# Patient Record
Sex: FEMALE | Race: WHITE | NOT HISPANIC OR LATINO
[De-identification: names, ages, dates, MRNs, and addresses within clinical notes are randomized per-mention and may not be internally consistent; named-entity substitution may affect disease eponyms.]

---

## 2018-03-05 ENCOUNTER — APPOINTMENT (OUTPATIENT)
Dept: OPHTHALMOLOGY | Facility: CLINIC | Age: 53
End: 2018-03-05
Payer: COMMERCIAL

## 2018-03-05 DIAGNOSIS — H04.203 UNSPECIFIED EPIPHORA, BILATERAL: ICD-10-CM

## 2018-03-05 PROCEDURE — 92002 INTRM OPH EXAM NEW PATIENT: CPT

## 2018-03-26 ENCOUNTER — APPOINTMENT (OUTPATIENT)
Dept: OPHTHALMOLOGY | Facility: CLINIC | Age: 53
End: 2018-03-26

## 2019-12-14 ENCOUNTER — TRANSCRIPTION ENCOUNTER (OUTPATIENT)
Age: 54
End: 2019-12-14

## 2020-01-01 ENCOUNTER — TRANSCRIPTION ENCOUNTER (OUTPATIENT)
Age: 55
End: 2020-01-01

## 2020-01-03 ENCOUNTER — TRANSCRIPTION ENCOUNTER (OUTPATIENT)
Age: 55
End: 2020-01-03

## 2020-10-16 PROBLEM — Z00.00 ENCOUNTER FOR PREVENTIVE HEALTH EXAMINATION: Noted: 2020-10-16

## 2020-10-28 ENCOUNTER — NON-APPOINTMENT (OUTPATIENT)
Age: 55
End: 2020-10-28

## 2020-10-28 ENCOUNTER — APPOINTMENT (OUTPATIENT)
Dept: GASTROENTEROLOGY | Facility: CLINIC | Age: 55
End: 2020-10-28
Payer: COMMERCIAL

## 2020-10-28 PROCEDURE — 99442: CPT

## 2020-11-10 ENCOUNTER — TRANSCRIPTION ENCOUNTER (OUTPATIENT)
Age: 55
End: 2020-11-10

## 2020-11-13 ENCOUNTER — OUTPATIENT (OUTPATIENT)
Dept: OUTPATIENT SERVICES | Facility: HOSPITAL | Age: 55
LOS: 1 days | End: 2020-11-13
Payer: COMMERCIAL

## 2020-11-13 ENCOUNTER — APPOINTMENT (OUTPATIENT)
Dept: GASTROENTEROLOGY | Facility: HOSPITAL | Age: 55
End: 2020-11-13
Payer: COMMERCIAL

## 2020-11-13 VITALS — SYSTOLIC BLOOD PRESSURE: 161 MMHG | DIASTOLIC BLOOD PRESSURE: 82 MMHG

## 2020-11-13 VITALS
WEIGHT: 231.93 LBS | OXYGEN SATURATION: 97 % | HEART RATE: 81 BPM | HEIGHT: 63 IN | TEMPERATURE: 97 F | DIASTOLIC BLOOD PRESSURE: 82 MMHG | SYSTOLIC BLOOD PRESSURE: 161 MMHG | RESPIRATION RATE: 13 BRPM

## 2020-11-13 DIAGNOSIS — K21.9 GASTRO-ESOPHAGEAL REFLUX DISEASE WITHOUT ESOPHAGITIS: ICD-10-CM

## 2020-11-13 DIAGNOSIS — Z98.89 OTHER SPECIFIED POSTPROCEDURAL STATES: Chronic | ICD-10-CM

## 2020-11-13 DIAGNOSIS — E04.1 NONTOXIC SINGLE THYROID NODULE: Chronic | ICD-10-CM

## 2020-11-13 PROCEDURE — C1889: CPT

## 2020-11-13 PROCEDURE — 91010 ESOPHAGUS MOTILITY STUDY: CPT | Mod: 26

## 2020-11-13 PROCEDURE — 91010 ESOPHAGUS MOTILITY STUDY: CPT

## 2020-11-13 PROCEDURE — 91037 ESOPH IMPED FUNCTION TEST: CPT | Mod: 26

## 2020-11-13 RX ORDER — SODIUM CHLORIDE 9 MG/ML
1000 INJECTION INTRAMUSCULAR; INTRAVENOUS; SUBCUTANEOUS
Refills: 0 | Status: DISCONTINUED | OUTPATIENT
Start: 2020-11-13 | End: 2020-11-13

## 2020-11-16 PROCEDURE — 91038 ESOPH IMPED FUNCT TEST > 1HR: CPT | Mod: 26

## 2021-08-03 ENCOUNTER — TRANSCRIPTION ENCOUNTER (OUTPATIENT)
Age: 56
End: 2021-08-03

## 2021-10-19 ENCOUNTER — APPOINTMENT (OUTPATIENT)
Dept: GASTROENTEROLOGY | Facility: CLINIC | Age: 56
End: 2021-10-19
Payer: COMMERCIAL

## 2021-10-19 VITALS
DIASTOLIC BLOOD PRESSURE: 77 MMHG | SYSTOLIC BLOOD PRESSURE: 132 MMHG | HEIGHT: 63 IN | HEART RATE: 96 BPM | BODY MASS INDEX: 42.17 KG/M2 | WEIGHT: 238 LBS | OXYGEN SATURATION: 97 %

## 2021-10-19 DIAGNOSIS — K22.711 BARRETT'S ESOPHAGUS WITH HIGH GRADE DYSPLASIA: ICD-10-CM

## 2021-10-19 PROCEDURE — 99214 OFFICE O/P EST MOD 30 MIN: CPT

## 2021-10-19 NOTE — HISTORY OF PRESENT ILLNESS
[FreeTextEntry1] : 55 yo F pmh Obesity s/p sleeve, arthritis being worked up for RA who presents to discuss GERD despite therapy and EGJOO on manometry.  Patient previously presented for emano and ph impedance OFF therapy as open access procedure.  Now referred by Dr. Dafne Bernard for next steps.\par \par GERD despite therapy\par Patient had positive pH study OFF therapy with good correlation to belching, regurgitation\par Since starting PPI/h2RA -> has had sig reduction in heartburn/regurgitation\par What has persistent is the early fullness and significant belching\par Has modified her diet greatly\par There is some nausea\par Heavier foods make this worse\par Feels everything sits in mid abdomen/belly in her abdomen\par No true dysphagia\par No odynophagia\par \par Has had 20 lb weight gain over past few months -> has led to some worsening symptoms as well\par \par \par Obesity\par s/p Gastric Sleeve - 2015 - done at [facility] (Dr. Yates)\par Had Vit A deficiency post as well as borderline iron leves\par Has not had rechecking of vitamins in quite some time\par Had Bas 1+ year ago - didn’t show any angulation\par Overall 70 lb weight gain, but \par \par \par Prior Workup:\par \par BaS 8/2020\par Small HH, no true dysmotility \par s/p sleeve -> no comment about anatomy\par \par E-mano 11/2020\par EGJOO in gastric sleeve\par Normal peristalsis otherwise\par \par pH study: OFF therapy\par 21.2 % AET - increase both supine, upright\par Good corerlation to belching

## 2021-10-19 NOTE — PHYSICAL EXAM
[General Appearance - Alert] : alert [General Appearance - Well Nourished] : well nourished [Sclera] : the sclera and conjunctiva were normal [Extraocular Movements] : extraocular movements were intact [Hearing Threshold Finger Rub Not [place]] : hearing was normal [Neck Appearance] : the appearance of the neck was normal [Neck Cervical Mass (___cm)] : no neck mass was observed [Respiration, Rhythm And Depth] : normal respiratory rhythm and effort [Auscultation Breath Sounds / Voice Sounds] : lungs were clear to auscultation bilaterally [Exaggerated Use Of Accessory Muscles For Inspiration] : no accessory muscle use [Edema] : there was no peripheral edema [Bowel Sounds] : normal bowel sounds [Abdomen Tenderness] : non-tender [Abdomen Soft] : soft [Abdomen Mass (___ Cm)] : no abdominal mass palpated [Abnormal Walk] : normal gait [Involuntary Movements] : no involuntary movements were seen [] : no rash [No Focal Deficits] : no focal deficits [FreeTextEntry1] : aox3 [Impaired Insight] : insight and judgment were intact [Affect] : the affect was normal

## 2021-10-19 NOTE — ASSESSMENT
[FreeTextEntry1] : 55 yo F pmh Obesity s/p sleeve, arthritis being worked up for RA who presents to discuss GERD despite therapy and EGJOO on manometry.  Appears to have significant food retention in either proximal stomach or distal esophagus given symptoms.  Significant belching as well.  Unclear if this is GERD despite therapy, due to angulation in sleeve, or EGJOO related.  At this point, would pursue workup for each.\par \par Impression:\par 1) Early Satiety\par 2) GERD despite therapy\par 3) Belching\par 4) Obesity s/p sleeve\par 5) Regurgitation\par \par Plan:\par 1) UGIS including assessment of sleeve and angulation\par 2) EGD + EndoFLIP\par 3) 24 hour pH impedance ON therapy - LES on mano study at 46.2 cm\par 4) All discussed at length.  All questions answered.  Plan agreed upon\par 5) RV 2-4 weeks after all testing

## 2021-10-20 LAB
BASOPHILS # BLD AUTO: 0.03 K/UL
BASOPHILS NFR BLD AUTO: 0.5 %
EOSINOPHIL # BLD AUTO: 0.09 K/UL
EOSINOPHIL NFR BLD AUTO: 1.5 %
HCT VFR BLD CALC: 40.4 %
HGB BLD-MCNC: 12.7 G/DL
IMM GRANULOCYTES NFR BLD AUTO: 0.2 %
LYMPHOCYTES # BLD AUTO: 2.22 K/UL
LYMPHOCYTES NFR BLD AUTO: 37.8 %
MAN DIFF?: NORMAL
MCHC RBC-ENTMCNC: 26.6 PG
MCHC RBC-ENTMCNC: 31.4 GM/DL
MCV RBC AUTO: 84.7 FL
MONOCYTES # BLD AUTO: 0.44 K/UL
MONOCYTES NFR BLD AUTO: 7.5 %
NEUTROPHILS # BLD AUTO: 3.09 K/UL
NEUTROPHILS NFR BLD AUTO: 52.5 %
PLATELET # BLD AUTO: 275 K/UL
RBC # BLD: 4.77 M/UL
RBC # FLD: 16 %
WBC # FLD AUTO: 5.88 K/UL

## 2021-10-21 ENCOUNTER — TRANSCRIPTION ENCOUNTER (OUTPATIENT)
Age: 56
End: 2021-10-21

## 2021-10-22 LAB
25(OH)D3 SERPL-MCNC: 60.2 NG/ML
ALBUMIN SERPL ELPH-MCNC: 4.5 G/DL
ALP BLD-CCNC: 69 U/L
ALT SERPL-CCNC: 20 U/L
ANION GAP SERPL CALC-SCNC: 25 MMOL/L
AST SERPL-CCNC: 21 U/L
BILIRUB SERPL-MCNC: <0.2 MG/DL
BUN SERPL-MCNC: 11 MG/DL
CALCIUM SERPL-MCNC: 9.2 MG/DL
CHLORIDE SERPL-SCNC: 105 MMOL/L
CO2 SERPL-SCNC: 18 MMOL/L
CREAT SERPL-MCNC: 0.72 MG/DL
FERRITIN SERPL-MCNC: 22 NG/ML
FOLATE SERPL-MCNC: 12.9 NG/ML
GLUCOSE SERPL-MCNC: 87 MG/DL
IGA SER QL IEP: 280 MG/DL
IGG SER QL IEP: 1252 MG/DL
IGM SER QL IEP: 245 MG/DL
IRON SERPL-MCNC: 51 UG/DL
POTASSIUM SERPL-SCNC: 4.3 MMOL/L
PREALB SERPL NEPH-MCNC: 25 MG/DL
PROT SERPL-MCNC: 7.4 G/DL
SELENIUM SERPL-MCNC: 135 UG/L
SODIUM SERPL-SCNC: 147 MMOL/L
VIT B12 SERPL-MCNC: 1054 PG/ML

## 2021-10-27 LAB
VIT A SERPL-MCNC: 37.7 UG/DL
VIT B1 SERPL-MCNC: 137.4 NMOL/L
VIT B6 SERPL-MCNC: 45.8 UG/L
ZINC SERPL-MCNC: 139 UG/DL

## 2021-11-01 LAB
NICOTINAMIDE: 81.8 NG/ML
NICOTINIC ACID: <5 NG/ML
VIT C SERPL-MCNC: 0.6 MG/DL

## 2021-11-29 ENCOUNTER — APPOINTMENT (OUTPATIENT)
Dept: DISASTER EMERGENCY | Facility: CLINIC | Age: 56
End: 2021-11-29

## 2021-11-30 LAB — SARS-COV-2 N GENE NPH QL NAA+PROBE: NOT DETECTED

## 2021-12-01 ENCOUNTER — APPOINTMENT (OUTPATIENT)
Dept: GASTROENTEROLOGY | Facility: HOSPITAL | Age: 56
End: 2021-12-01
Payer: COMMERCIAL

## 2021-12-01 ENCOUNTER — OUTPATIENT (OUTPATIENT)
Dept: OUTPATIENT SERVICES | Facility: HOSPITAL | Age: 56
LOS: 1 days | End: 2021-12-01
Payer: COMMERCIAL

## 2021-12-01 VITALS
HEIGHT: 63 IN | TEMPERATURE: 97 F | HEART RATE: 85 BPM | DIASTOLIC BLOOD PRESSURE: 69 MMHG | WEIGHT: 229.94 LBS | SYSTOLIC BLOOD PRESSURE: 118 MMHG | RESPIRATION RATE: 19 BRPM | OXYGEN SATURATION: 99 %

## 2021-12-01 VITALS
RESPIRATION RATE: 18 BRPM | DIASTOLIC BLOOD PRESSURE: 71 MMHG | HEART RATE: 78 BPM | OXYGEN SATURATION: 99 % | SYSTOLIC BLOOD PRESSURE: 117 MMHG

## 2021-12-01 DIAGNOSIS — K21.9 GASTRO-ESOPHAGEAL REFLUX DISEASE WITHOUT ESOPHAGITIS: ICD-10-CM

## 2021-12-01 DIAGNOSIS — Z98.89 OTHER SPECIFIED POSTPROCEDURAL STATES: Chronic | ICD-10-CM

## 2021-12-01 DIAGNOSIS — E04.1 NONTOXIC SINGLE THYROID NODULE: Chronic | ICD-10-CM

## 2021-12-01 DIAGNOSIS — R68.81 EARLY SATIETY: ICD-10-CM

## 2021-12-01 PROCEDURE — 91040 ESOPH BALLOON DISTENSION TST: CPT | Mod: 26

## 2021-12-01 PROCEDURE — C1889: CPT

## 2021-12-01 PROCEDURE — 43239 EGD BIOPSY SINGLE/MULTIPLE: CPT

## 2021-12-01 PROCEDURE — 91037 ESOPH IMPED FUNCTION TEST: CPT

## 2021-12-01 PROCEDURE — 43235 EGD DIAGNOSTIC BRUSH WASH: CPT

## 2021-12-01 PROCEDURE — 91040 ESOPH BALLOON DISTENSION TST: CPT

## 2021-12-01 RX ORDER — GABAPENTIN 400 MG/1
1 CAPSULE ORAL
Qty: 0 | Refills: 0 | DISCHARGE

## 2021-12-01 RX ORDER — FEXOFENADINE HCL 30 MG
0 TABLET ORAL
Qty: 0 | Refills: 0 | DISCHARGE

## 2021-12-01 RX ORDER — MELOXICAM 15 MG/1
1 TABLET ORAL
Qty: 0 | Refills: 0 | DISCHARGE

## 2021-12-01 RX ORDER — SODIUM CHLORIDE 9 MG/ML
500 INJECTION INTRAMUSCULAR; INTRAVENOUS; SUBCUTANEOUS
Refills: 0 | Status: DISCONTINUED | OUTPATIENT
Start: 2021-12-01 | End: 2021-12-16

## 2021-12-01 RX ORDER — RABEPRAZOLE 20 MG/1
1 TABLET, DELAYED RELEASE ORAL
Qty: 0 | Refills: 0 | DISCHARGE

## 2021-12-01 NOTE — ASU PATIENT PROFILE, ADULT - FALL HARM RISK - UNIVERSAL INTERVENTIONS
Bed in lowest position, wheels locked, appropriate side rails in place/Call bell, personal items and telephone in reach/Instruct patient to call for assistance before getting out of bed or chair/Non-slip footwear when patient is out of bed/[place] to call system/Physically safe environment - no spills, clutter or unnecessary equipment/Purposeful Proactive Rounding/Room/bathroom lighting operational, light cord in reach

## 2021-12-01 NOTE — ASU DISCHARGE PLAN (ADULT/PEDIATRIC) - NS MD DC FALL RISK RISK
For information on Fall & Injury Prevention, visit: [email or web address] OR  [email or web address] OR  https://www.cdc.gov/steadi/patient.html

## 2021-12-02 PROCEDURE — 91038 ESOPH IMPED FUNCT TEST > 1HR: CPT | Mod: 26

## 2021-12-21 ENCOUNTER — APPOINTMENT (OUTPATIENT)
Dept: GASTROENTEROLOGY | Facility: CLINIC | Age: 56
End: 2021-12-21
Payer: COMMERCIAL

## 2021-12-21 VITALS
SYSTOLIC BLOOD PRESSURE: 130 MMHG | HEIGHT: 63 IN | OXYGEN SATURATION: 98 % | TEMPERATURE: 97.7 F | DIASTOLIC BLOOD PRESSURE: 75 MMHG | HEART RATE: 94 BPM | BODY MASS INDEX: 41.64 KG/M2 | WEIGHT: 235 LBS

## 2021-12-21 DIAGNOSIS — K22.2 ESOPHAGEAL OBSTRUCTION: ICD-10-CM

## 2021-12-21 PROCEDURE — 99214 OFFICE O/P EST MOD 30 MIN: CPT

## 2021-12-21 NOTE — HISTORY OF PRESENT ILLNESS
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 Appears to have significant food retention in either proximal stomach or distal esophagus given symptoms. Significant belching as well. Unclear if this is GERD despite therapy, due to angulation in sleeve, or EGJOO related. At this point, would pursue workup for each.\par \par Impression:\par 1) Early Satiety\par 2) GERD despite therapy\par 3) Belching\par 4) Obesity s/p sleeve\par 5) Regurgitation\par \par Plan:\par 1) UGIS including assessment of sleeve and angulation\par 2) EGD + EndoFLIP\par 3) 24 hour pH impedance ON therapy - LES on mano study at 46.2 cm\par 4) All discussed at length. All questions answered. Plan agreed upon\par 5) RV 2-4 weeks after all testing. \par \par --------------------------------------------------------------------------------------\par Since last test\par Had EGD/EndoFLIP/pH\par DId not get UGIS\par \par Currently:\par At baseline, doing okay\par Significant chest discomfort, belching still occurring\par No dysphagia, odynophagia, n/v\par \par Presents to discuss options and test results.\par \par -------------------------------------------------------------------------------------\par Prior Workup:\par \par EGD 12/1/2021:\par 1 cm HH\par Sleeve gastrectomy\par 3x/endoscope width angulation\par EndoFLIP adequate LES deglutination and RACs\par \par 24 hour pH study ON therapy\par AET 3.1%, Upright 4.1%\par Gastric: 19.9%\par 60 reflux episodes\par Chest Pain: + SI, + SAP\par Belching: + SI, + SAP\par Increased bolus exposure time\par \par \par Other Prior Testing:\par BaS 8/2020\par Small HH, no true dysmotility \par s/p sleeve -> no comment about anatomy\par \par E-mano 11/2020\par EGJOO in gastric sleeve\par Normal peristalsis otherwise\par \par pH study: OFF therapy\par 21.2 % AET - increase both supine, upright\par Good correlation to belching

## 2021-12-21 NOTE — PHYSICAL EXAM
[General Appearance - Alert] : alert [General Appearance - Well Nourished] : well nourished [Sclera] : the sclera and conjunctiva were normal [Extraocular Movements] : extraocular movements were intact [Hearing Threshold Finger Rub Not [place]] : hearing was normal [Neck Appearance] : the appearance of the neck was normal [Neck Cervical Mass (___cm)] : no neck mass was observed [] : no respiratory distress [Respiration, Rhythm And Depth] : normal respiratory rhythm and effort [Exaggerated Use Of Accessory Muscles For Inspiration] : no accessory muscle use [Auscultation Breath Sounds / Voice Sounds] : lungs were clear to auscultation bilaterally [Edema] : there was no peripheral edema [Bowel Sounds] : normal bowel sounds [Abdomen Soft] : soft [Abdomen Tenderness] : non-tender [Abdomen Mass (___ Cm)] : no abdominal mass palpated [Abnormal Walk] : normal gait [Involuntary Movements] : no involuntary movements were seen [No Focal Deficits] : no focal deficits [Impaired Insight] : insight and judgment were intact [Affect] : the affect was normal [FreeTextEntry1] : aox3

## 2021-12-21 NOTE — ASSESSMENT
[FreeTextEntry1] : 55 yo F pmh Obesity s/p sleeve, arthritis being worked up for RA who presents to discuss GERD despite therapy and EGJOO on manometry.  EGJOO was artificial elevated IRP given normal endoflip.  At this time, assume esophageal motility is normal.  However there is some angulation in stomach and persistent reflux (acidic and non acidic) despite adequate PPI suppression with excellent symptom correlation.  Suspect that sleeve angulation may be cause.  Will need UGIS to officially assess - pending result may benefit from pneumatic dilation of sleeve vs. conversion to jay en y.   Will add on Gaviscon + Alginate to see if beneficial in the meantime\par \par Impression:\par 1) Early Satiety\par 2) GERD despite therapy - + ph study ON therapy - 1 cm HH\par 3) Belching - + SAP/+SI on ph study\par 4) Obesity s/p sleeve with possible angulation\par 5) Regurgitation\par \par Plan:\par 1) UGIS including assessment of sleeve and angulation\par 2) Referral to bariatrics\par 3) Pending above, to discuss at multidisciplinary conference\par 4) Trial of Gaviscon/Alginate\par 5) All discussed at length. All questions answered. Plan agreed upon\par 6) RV pending above\par 7) Extended visit reviewing all testing

## 2022-01-14 ENCOUNTER — OUTPATIENT (OUTPATIENT)
Dept: OUTPATIENT SERVICES | Facility: HOSPITAL | Age: 57
LOS: 1 days | End: 2022-01-14
Payer: COMMERCIAL

## 2022-01-14 DIAGNOSIS — K21.9 GASTRO-ESOPHAGEAL REFLUX DISEASE WITHOUT ESOPHAGITIS: ICD-10-CM

## 2022-01-14 DIAGNOSIS — E04.1 NONTOXIC SINGLE THYROID NODULE: Chronic | ICD-10-CM

## 2022-01-14 DIAGNOSIS — Z98.89 OTHER SPECIFIED POSTPROCEDURAL STATES: Chronic | ICD-10-CM

## 2022-01-14 PROCEDURE — 74246 X-RAY XM UPR GI TRC 2CNTRST: CPT

## 2022-01-14 PROCEDURE — 74246 X-RAY XM UPR GI TRC 2CNTRST: CPT | Mod: 26

## 2022-01-28 ENCOUNTER — NON-APPOINTMENT (OUTPATIENT)
Age: 57
End: 2022-01-28

## 2022-01-31 ENCOUNTER — NON-APPOINTMENT (OUTPATIENT)
Age: 57
End: 2022-01-31

## 2022-02-01 ENCOUNTER — APPOINTMENT (OUTPATIENT)
Dept: BARIATRICS | Facility: CLINIC | Age: 57
End: 2022-02-01
Payer: COMMERCIAL

## 2022-02-01 VITALS
WEIGHT: 244.71 LBS | TEMPERATURE: 96.7 F | HEIGHT: 63 IN | SYSTOLIC BLOOD PRESSURE: 128 MMHG | BODY MASS INDEX: 43.36 KG/M2 | DIASTOLIC BLOOD PRESSURE: 76 MMHG | OXYGEN SATURATION: 99 % | HEART RATE: 94 BPM

## 2022-02-01 DIAGNOSIS — Z13.0 ENCOUNTER FOR SCREENING FOR OTHER SUSPECTED ENDOCRINE DISORDER: ICD-10-CM

## 2022-02-01 DIAGNOSIS — R53.83 OTHER MALAISE: ICD-10-CM

## 2022-02-01 DIAGNOSIS — R06.83 SNORING: ICD-10-CM

## 2022-02-01 DIAGNOSIS — Z13.228 ENCOUNTER FOR SCREENING FOR OTHER SUSPECTED ENDOCRINE DISORDER: ICD-10-CM

## 2022-02-01 DIAGNOSIS — Z92.29 PERSONAL HISTORY OF OTHER DRUG THERAPY: ICD-10-CM

## 2022-02-01 DIAGNOSIS — R68.81 EARLY SATIETY: ICD-10-CM

## 2022-02-01 DIAGNOSIS — Z13.228 ENCOUNTER FOR SCREENING FOR OTHER METABOLIC DISORDERS: ICD-10-CM

## 2022-02-01 DIAGNOSIS — Z13.220 ENCOUNTER FOR SCREENING FOR LIPOID DISORDERS: ICD-10-CM

## 2022-02-01 DIAGNOSIS — Z13.29 ENCOUNTER FOR SCREENING FOR OTHER SUSPECTED ENDOCRINE DISORDER: ICD-10-CM

## 2022-02-01 DIAGNOSIS — R53.81 OTHER MALAISE: ICD-10-CM

## 2022-02-01 DIAGNOSIS — M51.36 OTHER INTERVERTEBRAL DISC DEGENERATION, LUMBAR REGION: ICD-10-CM

## 2022-02-01 PROCEDURE — 99204 OFFICE O/P NEW MOD 45 MIN: CPT

## 2022-02-01 RX ORDER — FLUOROMETHOLONE 2.5 MG/ML
0.25 SUSPENSION/ DROPS OPHTHALMIC
Qty: 5 | Refills: 1 | Status: DISCONTINUED | COMMUNITY
Start: 2018-03-05 | End: 2022-02-01

## 2022-02-01 RX ORDER — TIZANIDINE 4 MG/1
4 TABLET ORAL EVERY 8 HOURS
Refills: 0 | Status: DISCONTINUED | COMMUNITY
Start: 2021-10-19 | End: 2022-02-01

## 2022-02-08 PROBLEM — R68.81 EARLY SATIETY: Status: ACTIVE | Noted: 2021-10-19

## 2022-02-09 NOTE — PHYSICAL EXAM
[Obese] : obese [Normal] : well developed, well nourished, in no acute distress [de-identified] : EOMI , Anicteric  [de-identified] : obese soft non tender / no erythema no swelling noted  no evidence of hernia

## 2022-02-09 NOTE — HISTORY OF PRESENT ILLNESS
[Procedure: ___] : Procedure performed: [unfilled]  [Date of Surgery: ___] : Date of Surgery:   [unfilled] [Surgeon Name:   ___] : Surgeon Name: Dr. MOURA [Pre-Op Weight ___] : Pre-op weight was [unfilled] lbs [de-identified] : 56 year old F history of  lap sleeve gastrectomy in 2015 - Dr. Yates. Patient here as initial consult due to complaints of moderate to severe GERD symptoms, food retention, belching and history of early satiety. Pt states reflux started ~ 2019.  Pt has had a full work up as per Dr. Gage Burns - including Upper EGD  ( 1 cm hiatal hernia) / GI series / Endoflip - normal  and manometry testing  +pH study on therapy. Complaints of increase chest discomfort after eating.  Pt was prescribed omeprazole 20 mg + Gaviscon + Alginate with minimal relief.\par \par  Pt believes she is consuming  an adequate  amount of protein and water daily.  No nausea or vomiting. Pt is tolerating textured and solid foods without any issues. Consuming 3 meals per day. Patient is takes vitamins. No constipation or diarrhea .  History of chronic pain associated with upper  and lower extremity osteoarthritis - pt has received pain injections and takes acetaminophen as needed. Pt is walking for exercise and is planning to start some strength training - however is limited due to chronic pain . Pt denies recent NSAID intake. Sleeping ~ 6- 8 hrs at night.  [de-identified] : Lowest weight - 211 lbs.

## 2022-02-09 NOTE — REVIEW OF SYSTEMS
[Reflux/Heartburn] : reflux/heartburn [Negative] : Allergic/Immunologic [Fever] : no fever [Recent Change In Weight] : ~T no recent weight change [Dysphagia] : no dysphagia [Chest Pain] : no chest pain [Shortness Of Breath] : no shortness of breath [Wheezing] : no wheezing [Cough] : no cough [SOB on Exertion] : no shortness of breath during exertion [Vomiting] : no vomiting [Constipation] : no constipation [Diarrhea] : no diarrhea [Dysuria] : no dysuria [Incontinence] : no incontinence [FreeTextEntry2] : recent weight loss  [FreeTextEntry7] : belching / dyspepsia / early satiety

## 2022-03-14 ENCOUNTER — NON-APPOINTMENT (OUTPATIENT)
Age: 57
End: 2022-03-14

## 2022-03-14 LAB
25(OH)D3 SERPL-MCNC: 56.7 NG/ML
ALBUMIN SERPL ELPH-MCNC: 3.9 G/DL
ALP BLD-CCNC: 57 U/L
ALT SERPL-CCNC: 16 U/L
ANION GAP SERPL CALC-SCNC: 11 MMOL/L
AST SERPL-CCNC: 13 U/L
BASOPHILS # BLD AUTO: 0.03 K/UL
BASOPHILS NFR BLD AUTO: 0.4 %
BILIRUB SERPL-MCNC: 0.2 MG/DL
BUN SERPL-MCNC: 12 MG/DL
CALCIUM SERPL-MCNC: 8.6 MG/DL
CHLORIDE SERPL-SCNC: 103 MMOL/L
CHOLEST SERPL-MCNC: 201 MG/DL
CO2 SERPL-SCNC: 26 MMOL/L
CREAT SERPL-MCNC: 0.64 MG/DL
EGFR: 104 ML/MIN/1.73M2
EOSINOPHIL # BLD AUTO: 0.07 K/UL
EOSINOPHIL NFR BLD AUTO: 1 %
ESTIMATED AVERAGE GLUCOSE: 117 MG/DL
FERRITIN SERPL-MCNC: 10 NG/ML
FOLATE SERPL-MCNC: 17.6 NG/ML
GLUCOSE SERPL-MCNC: 90 MG/DL
HBA1C MFR BLD HPLC: 5.7 %
HCT VFR BLD CALC: 37 %
HDLC SERPL-MCNC: 82 MG/DL
HGB BLD-MCNC: 11.7 G/DL
IMM GRANULOCYTES NFR BLD AUTO: 0.4 %
IRON SATN MFR SERPL: 14 %
IRON SERPL-MCNC: 48 UG/DL
LDLC SERPL CALC-MCNC: 106 MG/DL
LYMPHOCYTES # BLD AUTO: 2.44 K/UL
LYMPHOCYTES NFR BLD AUTO: 33.9 %
MAN DIFF?: NORMAL
MCHC RBC-ENTMCNC: 27.3 PG
MCHC RBC-ENTMCNC: 31.6 GM/DL
MCV RBC AUTO: 86.4 FL
MONOCYTES # BLD AUTO: 0.63 K/UL
MONOCYTES NFR BLD AUTO: 8.8 %
NEUTROPHILS # BLD AUTO: 3.99 K/UL
NEUTROPHILS NFR BLD AUTO: 55.5 %
NONHDLC SERPL-MCNC: 119 MG/DL
PLATELET # BLD AUTO: 270 K/UL
POTASSIUM SERPL-SCNC: 3.9 MMOL/L
PROT SERPL-MCNC: 6.7 G/DL
RBC # BLD: 4.28 M/UL
RBC # FLD: 15.6 %
SODIUM SERPL-SCNC: 139 MMOL/L
TIBC SERPL-MCNC: 340 UG/DL
TRIGL SERPL-MCNC: 69 MG/DL
TSH SERPL-ACNC: 1.42 UIU/ML
UIBC SERPL-MCNC: 292 UG/DL
VIT B12 SERPL-MCNC: 667 PG/ML
WBC # FLD AUTO: 7.19 K/UL

## 2022-03-15 ENCOUNTER — APPOINTMENT (OUTPATIENT)
Dept: BARIATRICS/WEIGHT MGMT | Facility: CLINIC | Age: 57
End: 2022-03-15
Payer: COMMERCIAL

## 2022-03-15 VITALS — HEIGHT: 63 IN | WEIGHT: 239 LBS | BODY MASS INDEX: 42.35 KG/M2

## 2022-03-15 PROCEDURE — 90791 PSYCH DIAGNOSTIC EVALUATION: CPT | Mod: 95

## 2022-03-17 LAB — ZINC SERPL-MCNC: 68 UG/DL

## 2022-03-22 ENCOUNTER — APPOINTMENT (OUTPATIENT)
Dept: CARDIOLOGY | Facility: CLINIC | Age: 57
End: 2022-03-22
Payer: COMMERCIAL

## 2022-03-22 ENCOUNTER — NON-APPOINTMENT (OUTPATIENT)
Age: 57
End: 2022-03-22

## 2022-03-22 VITALS
SYSTOLIC BLOOD PRESSURE: 127 MMHG | HEIGHT: 63 IN | HEART RATE: 80 BPM | DIASTOLIC BLOOD PRESSURE: 82 MMHG | BODY MASS INDEX: 42.52 KG/M2 | WEIGHT: 240 LBS | OXYGEN SATURATION: 100 %

## 2022-03-22 DIAGNOSIS — R01.1 CARDIAC MURMUR, UNSPECIFIED: ICD-10-CM

## 2022-03-22 LAB — VIT A SERPL-MCNC: 34.3 UG/DL

## 2022-03-22 PROCEDURE — 93000 ELECTROCARDIOGRAM COMPLETE: CPT | Mod: NC

## 2022-03-22 PROCEDURE — 99214 OFFICE O/P EST MOD 30 MIN: CPT

## 2022-03-22 PROCEDURE — 99204 OFFICE O/P NEW MOD 45 MIN: CPT

## 2022-03-22 RX ORDER — TIZANIDINE 2 MG/1
2 TABLET ORAL
Qty: 30 | Refills: 0 | Status: COMPLETED | COMMUNITY
Start: 2021-10-20

## 2022-03-22 RX ORDER — LEMBOREXANT 5 MG/1
5 TABLET, FILM COATED ORAL
Qty: 10 | Refills: 0 | Status: COMPLETED | COMMUNITY
Start: 2021-10-04

## 2022-03-22 RX ORDER — METHOCARBAMOL 500 MG/1
500 TABLET, FILM COATED ORAL
Qty: 60 | Refills: 0 | Status: COMPLETED | COMMUNITY
Start: 2021-03-02

## 2022-03-22 NOTE — HISTORY OF PRESENT ILLNESS
[FreeTextEntry1] : Penny Quintero presented to the office today for a cardiovascular evaluation.  She presents in anticipation of gastric bypass surgery.\par \par She is now 56 years old, with a history of obesity.  She had bariatric surgery in the past, with a sleeve gastrectomy.  Unfortunately she has been left with severe reflux, and remains overweight, such that she is being considered for a Lindsay-en-Y bypass.  She does not have a history of hypertension, diabetes or hyperlipidemia.  She is not known to have any underlying structural heart disease.  She has had iron deficiency anemia, and is getting iron supplementation, attributable to heavy menstrual periods.  She has been found to have a cardiac murmur in the past, which was attributed to flow, and considered benign.  She has had echocardiography in the past, last about 5 years ago.\par \par At baseline, she tries to stay physically active, but is limited by orthopedic issues.  She is able to climb 2 flights of stairs carrying groceries.  She does not report chest discomfort suggestive of angina.  She does not report dyspnea out of proportion to her weight and conditioning.  She denies symptoms of heart failure, and symptoms of significant arrhythmia.

## 2022-03-22 NOTE — PHYSICAL EXAM
[Well Developed] : well developed [No Acute Distress] : no acute distress [Obese] : obese [Normal Conjunctiva] : normal conjunctiva [Normal Venous Pressure] : normal venous pressure [No Carotid Bruit] : no carotid bruit [Normal S1, S2] : normal S1, S2 [No Murmur] : no murmur [No Rub] : no rub [No Gallop] : no gallop [Clear Lung Fields] : clear lung fields [Good Air Entry] : good air entry [No Respiratory Distress] : no respiratory distress  [Soft] : abdomen soft [Non Tender] : non-tender [No Masses/organomegaly] : no masses/organomegaly [Normal Bowel Sounds] : normal bowel sounds [Normal Gait] : normal gait [No Edema] : no edema [No Cyanosis] : no cyanosis [No Clubbing] : no clubbing [No Varicosities] : no varicosities [No Rash] : no rash [No Skin Lesions] : no skin lesions [Moves all extremities] : moves all extremities [No Focal Deficits] : no focal deficits [Normal Speech] : normal speech [Normal memory] : normal memory [Alert and Oriented] : alert and oriented

## 2022-03-22 NOTE — DISCUSSION/SUMMARY
[FreeTextEntry1] : Overall, her exercise capacity is reasonable.  I do not think that stress testing is necessary.  I have recommended an echocardiogram to evaluate her pulmonary pressures in light of her obesity and the potential for sleep apnea.  She will schedule this, and I will be in contact with her to discuss the results.\par \par Provided her results are favorable, she would be considered optimized for her planned surgery.  Routine hemodynamic monitoring would be recommended.

## 2022-03-23 ENCOUNTER — APPOINTMENT (OUTPATIENT)
Dept: CARDIOLOGY | Facility: CLINIC | Age: 57
End: 2022-03-23
Payer: COMMERCIAL

## 2022-03-23 LAB — VIT B1 SERPL-MCNC: 155 NMOL/L

## 2022-03-23 PROCEDURE — 93306 TTE W/DOPPLER COMPLETE: CPT

## 2022-03-24 LAB — VIT B6 SERPL-MCNC: 25 UG/L

## 2022-03-25 ENCOUNTER — APPOINTMENT (OUTPATIENT)
Dept: BARIATRICS/WEIGHT MGMT | Facility: CLINIC | Age: 57
End: 2022-03-25
Payer: COMMERCIAL

## 2022-03-25 VITALS — BODY MASS INDEX: 41.99 KG/M2 | HEIGHT: 63 IN | WEIGHT: 237 LBS

## 2022-03-25 PROCEDURE — 97802 MEDICAL NUTRITION INDIV IN: CPT | Mod: 95

## 2022-03-29 ENCOUNTER — APPOINTMENT (OUTPATIENT)
Dept: BARIATRICS | Facility: CLINIC | Age: 57
End: 2022-03-29
Payer: COMMERCIAL

## 2022-03-29 VITALS
BODY MASS INDEX: 41.29 KG/M2 | OXYGEN SATURATION: 98 % | HEIGHT: 63 IN | TEMPERATURE: 96.7 F | DIASTOLIC BLOOD PRESSURE: 82 MMHG | HEART RATE: 110 BPM | SYSTOLIC BLOOD PRESSURE: 130 MMHG | WEIGHT: 233 LBS

## 2022-03-29 DIAGNOSIS — Z01.818 ENCOUNTER FOR OTHER PREPROCEDURAL EXAMINATION: ICD-10-CM

## 2022-03-29 PROCEDURE — 99214 OFFICE O/P EST MOD 30 MIN: CPT

## 2022-03-30 ENCOUNTER — NON-APPOINTMENT (OUTPATIENT)
Age: 57
End: 2022-03-30

## 2022-03-30 PROBLEM — Z01.818 PREOP TESTING: Status: ACTIVE | Noted: 2021-11-28

## 2022-04-07 ENCOUNTER — NON-APPOINTMENT (OUTPATIENT)
Age: 57
End: 2022-04-07

## 2022-04-20 NOTE — HISTORY OF PRESENT ILLNESS
[Procedure: ___] : Procedure performed: [unfilled]  [Date of Surgery: ___] : Date of Surgery:   [unfilled] [Surgeon Name:   ___] : Surgeon Name: Dr. MOURA [Pre-Op Weight ___] : Pre-op weight was [unfilled] lbs [___ Years Post Op] : [unfilled] years [de-identified] : 56 year old F undergoing workup for revision of Laparoscopic Sleeve Gastrectomy. Current wt 233 lbs, lost 11 lbs since last in-office visit in 2/1/2022. Pt continues to make good food choices with adequate protein per meal, 3 meals/day. Reports no snacking. Has only zero calorie liquid per day but inadequate water intake per day, approximately 48oz/day. Has 1-2 BM/day. 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 Sleeping 6-7hr/night. Pt still having reflux describes as nausea when eat or sipping liquids with abundance of gas. Gas-x does help with symptoms. No nausea, vomiting, constipation or abdominal pain, fever, chills or sweats. Pt last seen by her GI Dr. Burns on 12/21/2021 for UGIS (see report below). Pt also saw a Hematologist Dr. Dutton for family history of thromboemboli, which all tests are negative. Is currently undergoing iron infusion therapy, started 1 out of 5 treatments started 3/23/2022 for low ferritin (10, nl ). Additionally, pt needs to repeat HST as per Pulmonary as study was unsuccessful due to equipment failure. Had ECHO last week and waiting on results.\par  [de-identified] : Lowest weight - 211 lbs.

## 2022-04-20 NOTE — DATA REVIEWED
[FreeTextEntry1] : ACC: [id number]     EXAM:  XR UGI DBL CON EFFERV STDY\par \par PROCEDURE DATE:  01/14/2022\par \par \par \par INTERPRETATION:  Clinical history: 56-year-old female, stricture in the gastric sleeve, assess gastric motility.\par \par Upper GI examination was performed, cine images were obtained. There are no previous imaging studies available for comparison at this institution.\par \par FINDINGS: Normal transit of contrast through the pharynx, esophagus, stomach remnant and duodenum with no Zenker's diverticulum or hiatal hernia. Mild gastroesophageal reflux was evident.\par \par No mucosal abnormality, intraluminal filling defect for external mass effect.\par \par Fluoroscopy time: 1.7 minutes\par \par IMPRESSION:\par No acute findings, in particular normal transit of contrast with no narrowing point\par \par --- End of Report ---\par \par \par DAMI VO DO; Attending Radiologist\par This document has been electronically signed. Jan 14 2022  1:14PM

## 2022-04-20 NOTE — ASSESSMENT
[FreeTextEntry1] : 56 year old F history of  lap sleeve gastrectomy in 2015 by Dr. Yates. Persistent symptom of reflux and gas with some relief from Gas-X. Persistent nausea as well\par \par Weight lost 11 lbs\par \par Will prescribe pt Zofran for nausea\par Patient re-educated on dietary and lifestyle changes in preparation for surgery\par Increase water intake\par Increase activities and continue to keep track of steps\par Pt will need to complete full infusion treatments and re-evaluated by Hematologist Dr. Dutton with blood work demonstrating good response to treatments.\par Will wait for remaining consulting studies for clearance for planned surgery\par \par All questions answered \par \par Return to office in one month\par \par Pt seen with Dr. Bautista\par \par Additional time spent before and after visit reviewing chart

## 2022-04-22 ENCOUNTER — NON-APPOINTMENT (OUTPATIENT)
Age: 57
End: 2022-04-22

## 2022-04-27 ENCOUNTER — NON-APPOINTMENT (OUTPATIENT)
Age: 57
End: 2022-04-27

## 2022-05-10 ENCOUNTER — APPOINTMENT (OUTPATIENT)
Dept: BARIATRICS | Facility: CLINIC | Age: 57
End: 2022-05-10

## 2022-05-10 VITALS
TEMPERATURE: 96.9 F | HEIGHT: 63 IN | SYSTOLIC BLOOD PRESSURE: 120 MMHG | OXYGEN SATURATION: 98 % | HEART RATE: 96 BPM | DIASTOLIC BLOOD PRESSURE: 80 MMHG | WEIGHT: 240.52 LBS | BODY MASS INDEX: 42.62 KG/M2

## 2022-05-10 DIAGNOSIS — K59.00 CONSTIPATION, UNSPECIFIED: ICD-10-CM

## 2022-05-10 PROCEDURE — 99214 OFFICE O/P EST MOD 30 MIN: CPT | Mod: 1L

## 2022-05-10 PROCEDURE — XXXXX: CPT | Mod: 1L

## 2022-05-11 PROBLEM — K59.00 CONSTIPATION IN FEMALE: Status: ACTIVE | Noted: 2022-05-11

## 2022-05-12 NOTE — PHYSICAL EXAM
[Normal] : affect appropriate [de-identified] : Eyeglasses [de-identified] : soft, NT, ND, normoactive bowel sounds, no hepatosplenomegaly or masses or diastasis appreciated\par incisions well healed, no drainage or bleeding

## 2022-05-12 NOTE — ASSESSMENT
[FreeTextEntry1] : 56 year old F is 7years s/p Laparoscopic Sleeve Gastrectomy (Dr. Yates at [place]) and undergoing workup for planned LGB. Current weight 240 lbs, weight since last visit\par \par Reviewed guidelines about nutrition and snacking - protein focus diet\par Continue with better food choices - maintain food log\par Continue MVI and daily zero calorie liquid intake to 64 oz/day\par Encouraged to participate in a daily exercise regimen incorporating cardio and strength training\par Track steps - goal approximately 8-10k steps per day\par Advised pt to eliminate decaf tea, continue zofran as needed and continue omeprazole \par Increase colace to TID and continue miralax for constipation\par Followup with Heme Dr. 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 Burns and possible surgery date end of June or July\par Will use lovenox postop for VTE prophylaxis\par Pt will need modified liquid diet 2 weeks and then clear liquids day before surgery\par Advised to monitor bowel habits during perioperative period and use constipation remedies as needed\par Reviewed pt's meds to meet pill size requirements post op, pt will have PCP prescribe alternatives\par \par All questions answered \par Return to office in one month\par \par Pt seen with Dr. Bautista\par \par Additional time spent before and after visit reviewing chart

## 2022-06-02 ENCOUNTER — NON-APPOINTMENT (OUTPATIENT)
Age: 57
End: 2022-06-02

## 2022-06-02 ENCOUNTER — APPOINTMENT (OUTPATIENT)
Dept: BARIATRICS | Facility: CLINIC | Age: 57
End: 2022-06-02

## 2022-06-02 VITALS
WEIGHT: 241 LBS | TEMPERATURE: 97 F | SYSTOLIC BLOOD PRESSURE: 128 MMHG | HEART RATE: 92 BPM | BODY MASS INDEX: 42.7 KG/M2 | DIASTOLIC BLOOD PRESSURE: 78 MMHG | OXYGEN SATURATION: 98 % | HEIGHT: 63 IN

## 2022-06-02 DIAGNOSIS — A04.8 OTHER SPECIFIED BACTERIAL INTESTINAL INFECTIONS: ICD-10-CM

## 2022-06-02 PROCEDURE — 99214 OFFICE O/P EST MOD 30 MIN: CPT

## 2022-06-02 RX ORDER — IRON/IRON ASP GLY/FA/MV-MIN 38 125-25-1MG
TABLET ORAL
Refills: 0 | Status: DISCONTINUED | COMMUNITY
End: 2022-06-02

## 2022-06-02 RX ORDER — MONTELUKAST 10 MG/1
10 TABLET, FILM COATED ORAL
Qty: 30 | Refills: 0 | Status: DISCONTINUED | COMMUNITY
Start: 2021-03-25 | End: 2022-06-02

## 2022-06-03 PROBLEM — A04.8 H. PYLORI INFECTION: Status: ACTIVE | Noted: 2022-06-03

## 2022-06-04 NOTE — PHYSICAL EXAM
[Normal] : affect appropriate [de-identified] : Eyeglasses [de-identified] : soft, NT, ND, normoactive bowel sounds, no hepatosplenomegaly or masses or diastasis appreciated\par incisions well healed, no drainage or bleeding

## 2022-06-04 NOTE — ASSESSMENT
[FreeTextEntry1] : 56 year old F is 7years s/p Laparoscopic Sleeve Gastrectomy (Dr. Yates at [facility]) and undergoing workup for planned LGB. Current weight 240 lbs, weight since last visit\par \par Reviewed guidelines about nutrition and snacking - protein focus diet\par Continue with better food choices - maintain food log\par Continue MVI and daily zero calorie liquid intake to 64 oz/day\par Encouraged to participate in a daily exercise regimen incorporating cardio and strength training\par Track steps - goal approximately 8-10k steps per day\par Advised pt to eliminate decaf tea, continue zofran as needed and continue omeprazole \par Increase colace to TID and continue miralax for constipation\par Followup with Heme Dr. 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 Burns and possible surgery date end of June or July\par Will use lovenox postop for VTE prophylaxis\par Pt will need modified liquid diet 2 weeks and then clear liquids day before surgery\par Advised to monitor bowel habits during perioperative period and use constipation remedies as needed\par Reviewed pt's meds to meet pill size requirements post op, pt will have PCP prescribe alternatives\par \par All questions answered \par Return to office in one month\par \par Pt seen with Dr. Bautista\par \par Additional time spent before and after visit reviewing chart

## 2022-06-04 NOTE — HISTORY OF PRESENT ILLNESS
[Procedure: ___] : Procedure performed: [unfilled]  [Date of Surgery: ___] : Date of Surgery:   [unfilled] [Surgeon Name:   ___] : Surgeon Name: Dr. MOURA [Pre-Op Weight ___] : Pre-op weight was [unfilled] lbs [de-identified] : 56 year old F is 7years s/p Laparoscopic Sleeve Gastrectomy (Dr. Yates at [facility]) and undergoing workup for planned LGB. Current weight 240 lbs, weight since last visit on 3/29/2022. Pt is consuming an adequate amount of protein each meal, consuming 3meals/day. Pt still having belching but nausea subsided after taking zofran since last visit. Reflux is also reported as subsided since stopped snacking after dinner and taking omeprazole. Drinking adequate zero calorie liquid per day, and eliminated all caffeine drinks but drinking decaf tea/coffee. Pt reports constipation taking colace and miralax, enema as needed. Pt is taking vitamin supplements as directed. No abdominal pain, vomiting, constipation or diarrhea.\par \par Pt last seen by her GI Dr. Burns on 12/21/2021 for UGIS. 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 Pt's Hematologist Dr. Dutton has currently stopped iron infusions due to elevated iron levels, though pt continuing on PO iron supplement and MVI. Per pt Dr. Dutton recommending lovenox postop for family history of thromboemboli, which all tests are negative. Additionally, pt received APAP machine yesterday.\par  [de-identified] : Lowest weight - 211 lbs.

## 2022-06-04 NOTE — ASSESSMENT
[FreeTextEntry1] : 56 year old F is 7years s/p Laparoscopic Sleeve Gastrectomy (Dr. Yates at [place]) and undergoing workup for planned LGB. Current weight 240 lbs, weight since last visit\par \par Reviewed guidelines about nutrition and snacking - protein focus diet\par Continue with better food choices - maintain food log\par Continue MVI and daily zero calorie liquid intake to 64 oz/day\par Encouraged to participate in a daily exercise regimen incorporating cardio and strength training\par Track steps - goal approximately 8-10k steps per day\par Advised pt to eliminate decaf tea, continue zofran as needed and continue omeprazole \par Increase colace to TID and continue miralax for constipation\par Followup with Heme Dr. 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 Burns and possible surgery date end of June or July\par Will use lovenox postop for VTE prophylaxis\par Pt will need modified liquid diet 2 weeks and then clear liquids day before surgery\par Advised to monitor bowel habits during perioperative period and use constipation remedies as needed\par Reviewed pt's meds to meet pill size requirements post op, pt will have PCP prescribe alternatives\par \par All questions answered \par Return to office in one month\par \par Pt seen with Dr. Bautsita\par \par Additional time spent before and after visit reviewing chart

## 2022-06-04 NOTE — PHYSICAL EXAM
[Normal] : affect appropriate [de-identified] : Eyeglasses [de-identified] : soft, NT, ND, normoactive bowel sounds, no hepatosplenomegaly or masses or diastasis appreciated\par incisions well healed, no drainage or bleeding

## 2022-06-04 NOTE — HISTORY OF PRESENT ILLNESS
[Procedure: ___] : Procedure performed: [unfilled]  [Date of Surgery: ___] : Date of Surgery:   [unfilled] [Surgeon Name:   ___] : Surgeon Name: Dr. MOURA [Pre-Op Weight ___] : Pre-op weight was [unfilled] lbs [de-identified] : 56 year old F is 7years s/p Laparoscopic Sleeve Gastrectomy (Dr. Yates at [place]) and undergoing workup for planned LGB. Current weight 240 lbs, weight since last visit on 3/29/2022. Pt is consuming an adequate amount of protein each meal, consuming 3meals/day. Pt still having belching but nausea subsided after taking zofran since last visit. Reflux is also reported as subsided since stopped snacking after dinner and taking omeprazole. Drinking adequate zero calorie liquid per day, and eliminated all caffeine drinks but drinking decaf tea/coffee. Pt reports constipation taking colace and miralax, enema as needed. Pt is taking vitamin supplements as directed. No abdominal pain, vomiting, constipation or diarrhea.\par \par Pt last seen by her GI Dr. Burns on 12/21/2021 for UGIS. 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 Pt's Hematologist Dr. Dutton has currently stopped iron infusions due to elevated iron levels, though pt continuing on PO iron supplement and MVI. Per pt Dr. Dutton recommending lovenox postop for family history of thromboemboli, which all tests are negative. Additionally, pt received APAP machine yesterday.\par  [de-identified] : Lowest weight - 211 lbs.

## 2022-07-06 ENCOUNTER — NON-APPOINTMENT (OUTPATIENT)
Age: 57
End: 2022-07-06

## 2022-07-20 ENCOUNTER — APPOINTMENT (OUTPATIENT)
Dept: BARIATRICS | Facility: CLINIC | Age: 57
End: 2022-07-20

## 2022-07-20 ENCOUNTER — OUTPATIENT (OUTPATIENT)
Dept: OUTPATIENT SERVICES | Facility: HOSPITAL | Age: 57
LOS: 1 days | End: 2022-07-20
Payer: COMMERCIAL

## 2022-07-20 VITALS
WEIGHT: 233.03 LBS | RESPIRATION RATE: 15 BRPM | TEMPERATURE: 98 F | SYSTOLIC BLOOD PRESSURE: 139 MMHG | HEART RATE: 90 BPM | HEIGHT: 63 IN | OXYGEN SATURATION: 98 % | DIASTOLIC BLOOD PRESSURE: 86 MMHG

## 2022-07-20 VITALS
HEIGHT: 63 IN | DIASTOLIC BLOOD PRESSURE: 78 MMHG | TEMPERATURE: 96.7 F | WEIGHT: 239.64 LBS | HEART RATE: 92 BPM | OXYGEN SATURATION: 98 % | BODY MASS INDEX: 42.46 KG/M2 | SYSTOLIC BLOOD PRESSURE: 126 MMHG

## 2022-07-20 DIAGNOSIS — E04.1 NONTOXIC SINGLE THYROID NODULE: Chronic | ICD-10-CM

## 2022-07-20 DIAGNOSIS — Z01.818 ENCOUNTER FOR OTHER PREPROCEDURAL EXAMINATION: ICD-10-CM

## 2022-07-20 DIAGNOSIS — Z90.3 ACQUIRED ABSENCE OF STOMACH [PART OF]: Chronic | ICD-10-CM

## 2022-07-20 DIAGNOSIS — K21.9 GASTRO-ESOPHAGEAL REFLUX DISEASE WITHOUT ESOPHAGITIS: ICD-10-CM

## 2022-07-20 DIAGNOSIS — Z98.89 OTHER SPECIFIED POSTPROCEDURAL STATES: Chronic | ICD-10-CM

## 2022-07-20 DIAGNOSIS — R14.2 ERUCTATION: ICD-10-CM

## 2022-07-20 DIAGNOSIS — K44.9 DIAPHRAGMATIC HERNIA WITHOUT OBSTRUCTION OR GANGRENE: ICD-10-CM

## 2022-07-20 DIAGNOSIS — Z90.89 ACQUIRED ABSENCE OF OTHER ORGANS: Chronic | ICD-10-CM

## 2022-07-20 DIAGNOSIS — R13.10 DYSPHAGIA, UNSPECIFIED: ICD-10-CM

## 2022-07-20 LAB
ALBUMIN SERPL ELPH-MCNC: 4.3 G/DL — SIGNIFICANT CHANGE UP (ref 3.3–5)
ALP SERPL-CCNC: 62 U/L — SIGNIFICANT CHANGE UP (ref 30–120)
ALT FLD-CCNC: 36 U/L DA — SIGNIFICANT CHANGE UP (ref 10–60)
ANION GAP SERPL CALC-SCNC: 10 MMOL/L — SIGNIFICANT CHANGE UP (ref 5–17)
AST SERPL-CCNC: 19 U/L — SIGNIFICANT CHANGE UP (ref 10–40)
BILIRUB SERPL-MCNC: 0.3 MG/DL — SIGNIFICANT CHANGE UP (ref 0.2–1.2)
BLD GP AB SCN SERPL QL: SIGNIFICANT CHANGE UP
BUN SERPL-MCNC: 19 MG/DL — SIGNIFICANT CHANGE UP (ref 7–23)
CALCIUM SERPL-MCNC: 9.6 MG/DL — SIGNIFICANT CHANGE UP (ref 8.4–10.5)
CHLORIDE SERPL-SCNC: 100 MMOL/L — SIGNIFICANT CHANGE UP (ref 96–108)
CO2 SERPL-SCNC: 29 MMOL/L — SIGNIFICANT CHANGE UP (ref 22–31)
CREAT SERPL-MCNC: 0.68 MG/DL — SIGNIFICANT CHANGE UP (ref 0.5–1.3)
EGFR: 102 ML/MIN/1.73M2 — SIGNIFICANT CHANGE UP
GLUCOSE SERPL-MCNC: 98 MG/DL — SIGNIFICANT CHANGE UP (ref 70–99)
HCT VFR BLD CALC: 42.6 % — SIGNIFICANT CHANGE UP (ref 34.5–45)
HGB BLD-MCNC: 13.9 G/DL — SIGNIFICANT CHANGE UP (ref 11.5–15.5)
MCHC RBC-ENTMCNC: 28.7 PG — SIGNIFICANT CHANGE UP (ref 27–34)
MCHC RBC-ENTMCNC: 32.6 GM/DL — SIGNIFICANT CHANGE UP (ref 32–36)
MCV RBC AUTO: 87.8 FL — SIGNIFICANT CHANGE UP (ref 80–100)
NRBC # BLD: 0 /100 WBCS — SIGNIFICANT CHANGE UP (ref 0–0)
PLATELET # BLD AUTO: 261 K/UL — SIGNIFICANT CHANGE UP (ref 150–400)
POTASSIUM SERPL-MCNC: 4.2 MMOL/L — SIGNIFICANT CHANGE UP (ref 3.5–5.3)
POTASSIUM SERPL-SCNC: 4.2 MMOL/L — SIGNIFICANT CHANGE UP (ref 3.5–5.3)
PROT SERPL-MCNC: 8.5 G/DL — HIGH (ref 6–8.3)
RBC # BLD: 4.85 M/UL — SIGNIFICANT CHANGE UP (ref 3.8–5.2)
RBC # FLD: 13.4 % — SIGNIFICANT CHANGE UP (ref 10.3–14.5)
SODIUM SERPL-SCNC: 139 MMOL/L — SIGNIFICANT CHANGE UP (ref 135–145)
WBC # BLD: 6.08 K/UL — SIGNIFICANT CHANGE UP (ref 3.8–10.5)
WBC # FLD AUTO: 6.08 K/UL — SIGNIFICANT CHANGE UP (ref 3.8–10.5)

## 2022-07-20 PROCEDURE — 99214 OFFICE O/P EST MOD 30 MIN: CPT

## 2022-07-20 PROCEDURE — G0463: CPT

## 2022-07-20 RX ORDER — ESZOPICLONE 2 MG/1
1 TABLET, COATED ORAL
Qty: 0 | Refills: 0 | DISCHARGE

## 2022-07-20 NOTE — H&P PST ADULT - ASSESSMENT
56 yo female is scheduled for diagnostic laparoscopy revision of gastric sleeve to laparoscopic jay en y gastric bypass with upper endoscopy, laparoscopic hiatal hernia repair on 8/9/2022

## 2022-07-20 NOTE — ASSESSMENT
[FreeTextEntry1] : 56 year old F is 7 years s/p Laparoscopic Sleeve Gastrectomy (Dr. Yates at [place]) and completed workup for planned Laparoscopic Gastric Bypass. Weight stable since last visit. Pt started liquid modified diet yesterday. Doing well overall\par \par Continue modified liquid diet\par Continue MVI until one week prior to surgery\par Daily zero calorie liquid intake to 64 oz/day\par Constipation remedies as needed\par Reviewed pt's meds to meet pill size requirements post op, pt will have PCP prescribe alternative for Cymbalta (duloxetine)\par \par All questions answered \par Pt seen with Dr. Bautista\par \par Additional time spent before and after visit reviewing chart

## 2022-07-20 NOTE — H&P PST ADULT - HISTORY OF PRESENT ILLNESS
58 yo female is scheduled for diagnostic laparoscopy revision of gastric sleeve to laparoscopic jay en y gastric bypass with upper endoscopy, laparoscopic hiatal hernia repair is planned for 8/9/2022.  She is s/p gastric sleeve 2015 with complaints of GERD since 2019.

## 2022-07-20 NOTE — H&P PST ADULT - NSICDXPASTSURGICALHX_GEN_ALL_CORE_FT
Addended by: JESSICA REYES on: 8/29/2019 02:34 PM     Modules accepted: Orders    
PAST SURGICAL HISTORY:  Cold thyroid nodule thyroidectomy    H/O gastric sleeve 2015    History of tonsillectomy childhood    S/P partial thyroidectomy 2012

## 2022-07-20 NOTE — H&P PST ADULT - NSICDXPASTMEDICALHX_GEN_ALL_CORE_FT
PAST MEDICAL HISTORY:  Chronic back pain     COVID-19 vaccine series completed     Dysphagia     GERD (gastroesophageal reflux disease)     Hiatal hernia     Insomnia     Lumbar disc herniation     Morbid obesity with BMI of 40.0-44.9, adult     Seasonal allergies     Sleep apnea

## 2022-07-20 NOTE — H&P PST ADULT - PROBLEM SELECTOR PLAN 1
Diagnostic laparoscopy revision of gastric sleeve to laparoscopic jay en y gastric bypass with upper endoscopy, laparoscopic hiatal hernia repair is planned for 8/9/2022  Covis testing is scheduled for 8/7/2022 @ [facility]  diagnostic testing performed; medical, hematology and cardiac clearances requested  Pre op instructions were reviewed; best wishes offered

## 2022-07-20 NOTE — PHYSICAL EXAM
[Normal] : affect appropriate [de-identified] : Eyeglasses [de-identified] : soft, NT, ND, no diastasis appreciated

## 2022-07-20 NOTE — H&P PST ADULT - NSWEIGHTCALCTOOLDRUG_GEN_A_CORE
No changes  We discussed risks of surgery which include but are not limited to: anesthetic complication; pulmonary embolism; cardiac arrest; bleeding requiring blood transfusion; infection; CSF leak; damage to the spinal nerves and/or cord resulting in paralysis, loss of bowel, bladder or sexual function; failure to relieve pain; increase in pain; excessive scar tissue formation; reduction in the range of motion; failure of hardware; need for further surgery; and death.  All questions were answered and consent was obtained.      used

## 2022-07-20 NOTE — HISTORY OF PRESENT ILLNESS
[Procedure: ___] : Procedure performed: [unfilled]  [Date of Surgery: ___] : Date of Surgery:   [unfilled] [Surgeon Name:   ___] : Surgeon Name: Dr. MOURA [Pre-Op Weight ___] : Pre-op weight was [unfilled] lbs [de-identified] : 56 year old F is 7 years s/p Laparoscopic Sleeve Gastrectomy (Dr. Yates at [place]) and completed workup for planned Laparoscopic Gastric Bypass. Weight stable since last visit. Pt started liquid modified diet yesterday. Pt still having belching/hiccup but nausea resolved. Reflux occasional daytime/nocturnal, still taking aciphex. Drinking adequate zero calorie liquid per day and eliminated all caffeine drinks. Taking vitamin supplements daily. Using APAP at night, tolerating well. No nausea, vomiting, constipation or diarrhea.\par \par Pt last seen by her GI Dr. Burns on 12/21/2021 for UGIS. 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 [de-identified] : Lowest weight - 211 lbs.

## 2022-07-20 NOTE — H&P PST ADULT - NSICDXFAMILYHX_GEN_ALL_CORE_FT
FAMILY HISTORY:  Father  Still living? No  Family history of pancreatic cancer, Age at diagnosis: Age Unknown    Mother  Still living? No  Family history of ovarian cancer, Age at diagnosis: Age Unknown

## 2022-07-26 RX ORDER — HYDROMORPHONE HYDROCHLORIDE 2 MG/ML
0.5 INJECTION INTRAMUSCULAR; INTRAVENOUS; SUBCUTANEOUS EVERY 4 HOURS
Refills: 0 | Status: DISCONTINUED | OUTPATIENT
Start: 2022-09-20 | End: 2022-09-21

## 2022-07-26 RX ORDER — SODIUM CHLORIDE 9 MG/ML
2000 INJECTION, SOLUTION INTRAVENOUS
Refills: 0 | Status: DISCONTINUED | OUTPATIENT
Start: 2022-09-20 | End: 2022-09-21

## 2022-07-26 RX ORDER — PANTOPRAZOLE SODIUM 20 MG/1
40 TABLET, DELAYED RELEASE ORAL DAILY
Refills: 0 | Status: DISCONTINUED | OUTPATIENT
Start: 2022-09-20 | End: 2022-09-21

## 2022-07-26 RX ORDER — ENOXAPARIN SODIUM 100 MG/ML
40 INJECTION SUBCUTANEOUS EVERY 12 HOURS
Refills: 0 | Status: DISCONTINUED | OUTPATIENT
Start: 2022-09-20 | End: 2022-09-21

## 2022-07-26 RX ORDER — HYOSCYAMINE SULFATE 0.13 MG
0.12 TABLET ORAL EVERY 6 HOURS
Refills: 0 | Status: DISCONTINUED | OUTPATIENT
Start: 2022-09-20 | End: 2022-09-21

## 2022-07-26 RX ORDER — ENOXAPARIN SODIUM 100 MG/ML
40 INJECTION SUBCUTANEOUS ONCE
Refills: 0 | Status: COMPLETED | OUTPATIENT
Start: 2022-09-20 | End: 2022-09-20

## 2022-07-26 RX ORDER — SODIUM CHLORIDE 9 MG/ML
1000 INJECTION, SOLUTION INTRAVENOUS
Refills: 0 | Status: DISCONTINUED | OUTPATIENT
Start: 2022-09-20 | End: 2022-09-21

## 2022-07-26 RX ORDER — ONDANSETRON 8 MG/1
4 TABLET, FILM COATED ORAL EVERY 6 HOURS
Refills: 0 | Status: DISCONTINUED | OUTPATIENT
Start: 2022-09-20 | End: 2022-09-21

## 2022-07-28 ENCOUNTER — NON-APPOINTMENT (OUTPATIENT)
Age: 57
End: 2022-07-28

## 2022-07-29 ENCOUNTER — NON-APPOINTMENT (OUTPATIENT)
Age: 57
End: 2022-07-29

## 2022-08-01 ENCOUNTER — NON-APPOINTMENT (OUTPATIENT)
Age: 57
End: 2022-08-01

## 2022-08-02 PROBLEM — G47.00 INSOMNIA, UNSPECIFIED: Chronic | Status: ACTIVE | Noted: 2022-07-20

## 2022-08-02 PROBLEM — M51.26 OTHER INTERVERTEBRAL DISC DISPLACEMENT, LUMBAR REGION: Chronic | Status: ACTIVE | Noted: 2022-07-20

## 2022-08-02 PROBLEM — R13.10 DYSPHAGIA, UNSPECIFIED: Chronic | Status: ACTIVE | Noted: 2022-07-20

## 2022-08-02 PROBLEM — K44.9 DIAPHRAGMATIC HERNIA WITHOUT OBSTRUCTION OR GANGRENE: Chronic | Status: ACTIVE | Noted: 2022-07-20

## 2022-08-02 PROBLEM — M54.9 DORSALGIA, UNSPECIFIED: Chronic | Status: ACTIVE | Noted: 2022-07-20

## 2022-08-02 PROBLEM — Z92.29 PERSONAL HISTORY OF OTHER DRUG THERAPY: Chronic | Status: ACTIVE | Noted: 2022-07-20

## 2022-08-02 PROBLEM — E66.01 MORBID (SEVERE) OBESITY DUE TO EXCESS CALORIES: Chronic | Status: ACTIVE | Noted: 2022-07-20

## 2022-08-02 PROBLEM — G47.30 SLEEP APNEA, UNSPECIFIED: Chronic | Status: ACTIVE | Noted: 2022-07-20

## 2022-08-09 ENCOUNTER — APPOINTMENT (OUTPATIENT)
Dept: BARIATRICS | Facility: CLINIC | Age: 57
End: 2022-08-09

## 2022-08-11 ENCOUNTER — APPOINTMENT (OUTPATIENT)
Dept: BARIATRICS | Facility: CLINIC | Age: 57
End: 2022-08-11

## 2022-08-11 VITALS
TEMPERATURE: 96.7 F | OXYGEN SATURATION: 97 % | DIASTOLIC BLOOD PRESSURE: 78 MMHG | BODY MASS INDEX: 41.56 KG/M2 | WEIGHT: 234.57 LBS | HEIGHT: 63 IN | SYSTOLIC BLOOD PRESSURE: 124 MMHG | HEART RATE: 102 BPM

## 2022-08-11 PROCEDURE — XXXXX: CPT | Mod: 1L

## 2022-08-11 RX ORDER — ONDANSETRON 4 MG/1
4 TABLET, ORALLY DISINTEGRATING ORAL EVERY 8 HOURS
Qty: 90 | Refills: 0 | Status: DISCONTINUED | COMMUNITY
Start: 2022-03-29 | End: 2022-08-11

## 2022-08-11 RX ORDER — ONDANSETRON 4 MG/1
4 TABLET, ORALLY DISINTEGRATING ORAL EVERY 8 HOURS
Qty: 15 | Refills: 0 | Status: DISCONTINUED | COMMUNITY
Start: 2022-07-22 | End: 2022-08-11

## 2022-08-16 ENCOUNTER — APPOINTMENT (OUTPATIENT)
Dept: BARIATRICS | Facility: CLINIC | Age: 57
End: 2022-08-16

## 2022-08-22 ENCOUNTER — NON-APPOINTMENT (OUTPATIENT)
Age: 57
End: 2022-08-22

## 2022-08-22 RX ORDER — VITAMIN A 10000 UNIT
TABLET ORAL
Refills: 0 | Status: DISCONTINUED | COMMUNITY
End: 2022-08-22

## 2022-08-22 RX ORDER — UBIDECARENONE/VIT E ACET 100MG-5
CAPSULE ORAL
Refills: 0 | Status: DISCONTINUED | COMMUNITY
End: 2022-08-22

## 2022-08-22 RX ORDER — ACETAMINOPHEN 325 MG
TABLET ORAL
Refills: 0 | Status: DISCONTINUED | COMMUNITY
End: 2022-08-22

## 2022-08-22 RX ORDER — MULTIVITAMIN
TABLET ORAL
Refills: 0 | Status: DISCONTINUED | COMMUNITY
End: 2022-08-22

## 2022-08-31 ENCOUNTER — OUTPATIENT (OUTPATIENT)
Dept: OUTPATIENT SERVICES | Facility: HOSPITAL | Age: 57
LOS: 1 days | End: 2022-08-31
Payer: COMMERCIAL

## 2022-08-31 VITALS
HEIGHT: 63 IN | OXYGEN SATURATION: 97 % | RESPIRATION RATE: 15 BRPM | SYSTOLIC BLOOD PRESSURE: 134 MMHG | HEART RATE: 78 BPM | TEMPERATURE: 99 F | DIASTOLIC BLOOD PRESSURE: 81 MMHG | WEIGHT: 229.94 LBS

## 2022-08-31 DIAGNOSIS — Z98.89 OTHER SPECIFIED POSTPROCEDURAL STATES: Chronic | ICD-10-CM

## 2022-08-31 DIAGNOSIS — R13.10 DYSPHAGIA, UNSPECIFIED: ICD-10-CM

## 2022-08-31 DIAGNOSIS — K44.9 DIAPHRAGMATIC HERNIA WITHOUT OBSTRUCTION OR GANGRENE: ICD-10-CM

## 2022-08-31 DIAGNOSIS — Z90.89 ACQUIRED ABSENCE OF OTHER ORGANS: Chronic | ICD-10-CM

## 2022-08-31 DIAGNOSIS — Z90.3 ACQUIRED ABSENCE OF STOMACH [PART OF]: Chronic | ICD-10-CM

## 2022-08-31 DIAGNOSIS — Z01.818 ENCOUNTER FOR OTHER PREPROCEDURAL EXAMINATION: ICD-10-CM

## 2022-08-31 DIAGNOSIS — K21.9 GASTRO-ESOPHAGEAL REFLUX DISEASE WITHOUT ESOPHAGITIS: ICD-10-CM

## 2022-08-31 DIAGNOSIS — E04.1 NONTOXIC SINGLE THYROID NODULE: Chronic | ICD-10-CM

## 2022-08-31 LAB
ALBUMIN SERPL ELPH-MCNC: 3.8 G/DL — SIGNIFICANT CHANGE UP (ref 3.3–5)
ALP SERPL-CCNC: 65 U/L — SIGNIFICANT CHANGE UP (ref 30–120)
ALT FLD-CCNC: 39 U/L DA — SIGNIFICANT CHANGE UP (ref 10–60)
ANION GAP SERPL CALC-SCNC: 4 MMOL/L — LOW (ref 5–17)
AST SERPL-CCNC: 23 U/L — SIGNIFICANT CHANGE UP (ref 10–40)
BILIRUB SERPL-MCNC: 0.3 MG/DL — SIGNIFICANT CHANGE UP (ref 0.2–1.2)
BLD GP AB SCN SERPL QL: SIGNIFICANT CHANGE UP
BUN SERPL-MCNC: 14 MG/DL — SIGNIFICANT CHANGE UP (ref 7–23)
CALCIUM SERPL-MCNC: 9.2 MG/DL — SIGNIFICANT CHANGE UP (ref 8.4–10.5)
CHLORIDE SERPL-SCNC: 100 MMOL/L — SIGNIFICANT CHANGE UP (ref 96–108)
CO2 SERPL-SCNC: 35 MMOL/L — HIGH (ref 22–31)
CREAT SERPL-MCNC: 0.76 MG/DL — SIGNIFICANT CHANGE UP (ref 0.5–1.3)
EGFR: 92 ML/MIN/1.73M2 — SIGNIFICANT CHANGE UP
GLUCOSE SERPL-MCNC: 102 MG/DL — HIGH (ref 70–99)
HCT VFR BLD CALC: 39.9 % — SIGNIFICANT CHANGE UP (ref 34.5–45)
HGB BLD-MCNC: 13.1 G/DL — SIGNIFICANT CHANGE UP (ref 11.5–15.5)
MCHC RBC-ENTMCNC: 28.5 PG — SIGNIFICANT CHANGE UP (ref 27–34)
MCHC RBC-ENTMCNC: 32.8 GM/DL — SIGNIFICANT CHANGE UP (ref 32–36)
MCV RBC AUTO: 86.9 FL — SIGNIFICANT CHANGE UP (ref 80–100)
NRBC # BLD: 0 /100 WBCS — SIGNIFICANT CHANGE UP (ref 0–0)
PLATELET # BLD AUTO: 227 K/UL — SIGNIFICANT CHANGE UP (ref 150–400)
POTASSIUM SERPL-MCNC: 4.2 MMOL/L — SIGNIFICANT CHANGE UP (ref 3.5–5.3)
POTASSIUM SERPL-SCNC: 4.2 MMOL/L — SIGNIFICANT CHANGE UP (ref 3.5–5.3)
PROT SERPL-MCNC: 8 G/DL — SIGNIFICANT CHANGE UP (ref 6–8.3)
RBC # BLD: 4.59 M/UL — SIGNIFICANT CHANGE UP (ref 3.8–5.2)
RBC # FLD: 13.7 % — SIGNIFICANT CHANGE UP (ref 10.3–14.5)
SODIUM SERPL-SCNC: 139 MMOL/L — SIGNIFICANT CHANGE UP (ref 135–145)
WBC # BLD: 4.95 K/UL — SIGNIFICANT CHANGE UP (ref 3.8–10.5)
WBC # FLD AUTO: 4.95 K/UL — SIGNIFICANT CHANGE UP (ref 3.8–10.5)

## 2022-08-31 PROCEDURE — G0463: CPT

## 2022-08-31 NOTE — H&P PST ADULT - ASSESSMENT
58 yo female is scheduled for diagnostic laparoscopy revision of gastric sleeve to laparoscopic jay en y gastric bypass with upper endoscopy /laparoscopic hiatal hernia repair on 9/20/2022

## 2022-08-31 NOTE — H&P PST ADULT - HISTORY OF PRESENT ILLNESS
58 yo female is rescheduled from 8/9/2022 for positive covid test.  She states that she did not have any complications of covid.  She is scheduled for diagnostic laparoscopy revision of gastric sleeve to laparoscopic jay en Y gastric bypass with upper endoscopy /laparoscopic hiatal hernia repair is scheduled for 9/20/2022 @ [facility].

## 2022-08-31 NOTE — H&P PST ADULT - NSICDXPASTMEDICALHX_GEN_ALL_CORE_FT
PAST MEDICAL HISTORY:  Chronic back pain     COVID-19 vaccine series completed     Dysphagia     GERD (gastroesophageal reflux disease)     Hiatal hernia     History of 2019 novel coronavirus disease (COVID-19)     Insomnia     Lumbar disc herniation     Morbid obesity with BMI of 40.0-44.9, adult     Seasonal allergies     Sleep apnea     
negative - no pain, no limited range of motion

## 2022-08-31 NOTE — H&P PST ADULT - PROBLEM SELECTOR PLAN 1
Diagnostic laparoscopy revision of gastric sleeve to laparoscopic jay en y gastric bypass with upper endoscopy/ laparoscopic hiatal hernia repair is planned for 9/20/2022  Covid testing is nor required  Diagnostic testing performed today; medical clearance requested  Pre op instructions were reviewed; best wishes offered

## 2022-08-31 NOTE — H&P PST ADULT - NSICDXPASTSURGICALHX_GEN_ALL_CORE_FT
PAST SURGICAL HISTORY:  Cold thyroid nodule thyroidectomy    H/O gastric sleeve 2015    History of tonsillectomy childhood    S/P partial thyroidectomy 2012

## 2022-09-19 ENCOUNTER — TRANSCRIPTION ENCOUNTER (OUTPATIENT)
Age: 57
End: 2022-09-19

## 2022-09-19 NOTE — PATIENT PROFILE ADULT - STATED REASON FOR ADMISSION
Revision of gastric sleeve  to to lap gastyric bypass with upper endoscopy ,laparoscopic hiatal hernia repair

## 2022-09-20 ENCOUNTER — APPOINTMENT (OUTPATIENT)
Dept: BARIATRICS | Facility: CLINIC | Age: 57
End: 2022-09-20

## 2022-09-20 ENCOUNTER — APPOINTMENT (OUTPATIENT)
Dept: BARIATRICS | Facility: HOSPITAL | Age: 57
End: 2022-09-20

## 2022-09-20 ENCOUNTER — INPATIENT (INPATIENT)
Facility: HOSPITAL | Age: 57
LOS: 0 days | Discharge: HOME | DRG: 392 | End: 2022-09-21
Attending: SURGERY | Admitting: SURGERY
Payer: COMMERCIAL

## 2022-09-20 VITALS
HEIGHT: 63 IN | DIASTOLIC BLOOD PRESSURE: 59 MMHG | HEART RATE: 80 BPM | OXYGEN SATURATION: 98 % | WEIGHT: 226.64 LBS | RESPIRATION RATE: 19 BRPM | TEMPERATURE: 98 F | SYSTOLIC BLOOD PRESSURE: 122 MMHG

## 2022-09-20 DIAGNOSIS — Z98.89 OTHER SPECIFIED POSTPROCEDURAL STATES: Chronic | ICD-10-CM

## 2022-09-20 DIAGNOSIS — Z90.89 ACQUIRED ABSENCE OF OTHER ORGANS: Chronic | ICD-10-CM

## 2022-09-20 DIAGNOSIS — K44.9 DIAPHRAGMATIC HERNIA WITHOUT OBSTRUCTION OR GANGRENE: ICD-10-CM

## 2022-09-20 DIAGNOSIS — Z90.3 ACQUIRED ABSENCE OF STOMACH [PART OF]: Chronic | ICD-10-CM

## 2022-09-20 DIAGNOSIS — E04.1 NONTOXIC SINGLE THYROID NODULE: Chronic | ICD-10-CM

## 2022-09-20 DIAGNOSIS — K21.9 GASTRO-ESOPHAGEAL REFLUX DISEASE WITHOUT ESOPHAGITIS: ICD-10-CM

## 2022-09-20 LAB — HCG UR QL: NEGATIVE — SIGNIFICANT CHANGE UP

## 2022-09-20 PROCEDURE — 44180 LAP ENTEROLYSIS: CPT

## 2022-09-20 PROCEDURE — 44180 LAP ENTEROLYSIS: CPT | Mod: AS

## 2022-09-20 DEVICE — STAPLER COVIDIEN TRI-STAPLE 45MM PURPLE RELOAD: Type: IMPLANTABLE DEVICE | Status: FUNCTIONAL

## 2022-09-20 DEVICE — STAPLER COVIDIEN TRI-STAPLE 60MM PURPLE RELOAD: Type: IMPLANTABLE DEVICE | Status: FUNCTIONAL

## 2022-09-20 DEVICE — STAPLER COVIDIEN TRI-STAPLE 45MM TAN RELOAD: Type: IMPLANTABLE DEVICE | Status: FUNCTIONAL

## 2022-09-20 DEVICE — SPONGE HSTAT SURGICEL 2X14": Type: IMPLANTABLE DEVICE | Status: FUNCTIONAL

## 2022-09-20 DEVICE — STAPLER COVIDIEN TRI-STAPLE 60MM TAN RELOAD: Type: IMPLANTABLE DEVICE | Status: FUNCTIONAL

## 2022-09-20 DEVICE — STAPLER COVIDIEN TRI-STAPLE 60MM BLACK RELOAD: Type: IMPLANTABLE DEVICE | Status: FUNCTIONAL

## 2022-09-20 DEVICE — IMPLANTABLE DEVICE: Type: IMPLANTABLE DEVICE | Status: FUNCTIONAL

## 2022-09-20 RX ORDER — APREPITANT 80 MG/1
40 CAPSULE ORAL ONCE
Refills: 0 | Status: COMPLETED | OUTPATIENT
Start: 2022-09-20 | End: 2022-09-20

## 2022-09-20 RX ORDER — ONDANSETRON 8 MG/1
4 TABLET, FILM COATED ORAL ONCE
Refills: 0 | Status: COMPLETED | OUTPATIENT
Start: 2022-09-20 | End: 2022-09-20

## 2022-09-20 RX ORDER — PANTOPRAZOLE SODIUM 20 MG/1
40 TABLET, DELAYED RELEASE ORAL DAILY
Refills: 0 | Status: DISCONTINUED | OUTPATIENT
Start: 2022-09-20 | End: 2022-09-20

## 2022-09-20 RX ORDER — HYDROMORPHONE HYDROCHLORIDE 2 MG/ML
0.5 INJECTION INTRAMUSCULAR; INTRAVENOUS; SUBCUTANEOUS
Refills: 0 | Status: DISCONTINUED | OUTPATIENT
Start: 2022-09-20 | End: 2022-09-20

## 2022-09-20 RX ORDER — CEFAZOLIN SODIUM 1 G
2000 VIAL (EA) INJECTION ONCE
Refills: 0 | Status: COMPLETED | OUTPATIENT
Start: 2022-09-20 | End: 2022-09-20

## 2022-09-20 RX ORDER — IBUPROFEN 200 MG
800 TABLET ORAL EVERY 6 HOURS
Refills: 0 | Status: DISCONTINUED | OUTPATIENT
Start: 2022-09-20 | End: 2022-09-21

## 2022-09-20 RX ORDER — SODIUM CHLORIDE 9 MG/ML
1000 INJECTION, SOLUTION INTRAVENOUS
Refills: 0 | Status: DISCONTINUED | OUTPATIENT
Start: 2022-09-20 | End: 2022-09-21

## 2022-09-20 RX ORDER — HYOSCYAMINE SULFATE 0.13 MG
0.12 TABLET ORAL EVERY 6 HOURS
Refills: 0 | Status: DISCONTINUED | OUTPATIENT
Start: 2022-09-20 | End: 2022-09-20

## 2022-09-20 RX ORDER — HYDROMORPHONE HYDROCHLORIDE 2 MG/ML
1 INJECTION INTRAMUSCULAR; INTRAVENOUS; SUBCUTANEOUS
Refills: 0 | Status: DISCONTINUED | OUTPATIENT
Start: 2022-09-20 | End: 2022-09-20

## 2022-09-20 RX ORDER — FLUTICASONE PROPIONATE 50 MCG
1 SPRAY, SUSPENSION NASAL
Qty: 0 | Refills: 0 | DISCHARGE

## 2022-09-20 RX ORDER — ACETAMINOPHEN 500 MG
1000 TABLET ORAL EVERY 6 HOURS
Refills: 0 | Status: COMPLETED | OUTPATIENT
Start: 2022-09-20 | End: 2022-09-21

## 2022-09-20 RX ORDER — CHLORHEXIDINE GLUCONATE 213 G/1000ML
1 SOLUTION TOPICAL ONCE
Refills: 0 | Status: COMPLETED | OUTPATIENT
Start: 2022-09-20 | End: 2022-09-20

## 2022-09-20 RX ORDER — ESZOPICLONE 2 MG/1
1 TABLET, COATED ORAL
Qty: 0 | Refills: 0 | DISCHARGE

## 2022-09-20 RX ORDER — SODIUM CHLORIDE 9 MG/ML
1000 INJECTION, SOLUTION INTRAVENOUS
Refills: 0 | Status: DISCONTINUED | OUTPATIENT
Start: 2022-09-20 | End: 2022-09-20

## 2022-09-20 RX ORDER — ACETAMINOPHEN 500 MG
1000 TABLET ORAL EVERY 6 HOURS
Refills: 0 | Status: DISCONTINUED | OUTPATIENT
Start: 2022-09-21 | End: 2022-09-21

## 2022-09-20 RX ORDER — DULOXETINE HYDROCHLORIDE 30 MG/1
1 CAPSULE, DELAYED RELEASE ORAL
Qty: 0 | Refills: 0 | DISCHARGE

## 2022-09-20 RX ADMIN — ONDANSETRON 4 MILLIGRAM(S): 8 TABLET, FILM COATED ORAL at 18:43

## 2022-09-20 RX ADMIN — Medication 400 MILLIGRAM(S): at 22:29

## 2022-09-20 RX ADMIN — APREPITANT 40 MILLIGRAM(S): 80 CAPSULE ORAL at 11:25

## 2022-09-20 RX ADMIN — Medication 400 MILLIGRAM(S): at 19:44

## 2022-09-20 RX ADMIN — ENOXAPARIN SODIUM 40 MILLIGRAM(S): 100 INJECTION SUBCUTANEOUS at 14:05

## 2022-09-20 RX ADMIN — HYDROMORPHONE HYDROCHLORIDE 0.5 MILLIGRAM(S): 2 INJECTION INTRAMUSCULAR; INTRAVENOUS; SUBCUTANEOUS at 19:29

## 2022-09-20 RX ADMIN — Medication 800 MILLIGRAM(S): at 19:51

## 2022-09-20 RX ADMIN — SODIUM CHLORIDE 1000 MILLILITER(S): 9 INJECTION, SOLUTION INTRAVENOUS at 11:24

## 2022-09-20 RX ADMIN — Medication 1000 MILLIGRAM(S): at 23:09

## 2022-09-20 RX ADMIN — CHLORHEXIDINE GLUCONATE 1 APPLICATION(S): 213 SOLUTION TOPICAL at 11:24

## 2022-09-20 RX ADMIN — HYDROMORPHONE HYDROCHLORIDE 0.5 MILLIGRAM(S): 2 INJECTION INTRAMUSCULAR; INTRAVENOUS; SUBCUTANEOUS at 18:52

## 2022-09-20 NOTE — ASU PREOP CHECKLIST - NS PREOP CHK TEST_COVID RESULT_GEN_ALL_CORE
Patient Education     Electrocardiogram (ECG)  The electrocardiogram (ECG) is a test that records electrical signals from your heart. The pattern of these signals can help tell the healthcare provider whether your heart is normal, under stress, or having  electrical problems, changes in the heartbeat, strain, or other damage.    Getting ready  · Wear loose, comfortable clothing that allows easy access to the chest.  · Allow enough time before your ECG to check in. You will likely need to fill out paperwork before the test.    What happens during an ECG  · You will be asked to remove your clothing from the waist up and to put on a gown. You will then lie down on your back.  · Electrodes (small pads) are placed on your chest, shoulders, and legs. If you have excess body hair that keeps the electrodes from making contact with your skin, small patches of hair may need to be shaved.  · If your skin is moist or sweaty, it may have to be dried off for the small patches to stick to the skin.  · The electrodes record your heart rhythm and any change in your heart’s signals that occur during the test.  · After a few minutes of recording, the healthcare provider will remove the electrodes. Occasionally, they may have to repeat the ECG to get accurate results. The ECG takes about 10 minutes.    What happens after the test  · You can resume your normal activity.  · The results are sent to your doctor for interpretation.  · Be sure to keep your follow-up appointment.  Your next appointment is:____________________  Tell your healthcare provider if you:  · Are taking any medicines including over-the-counter or non-prescribed medicines, or, illegal drugs,  · Feel any chest discomfort, pain, burning, tightness, or pressure  · Feel as though your heart is beating rapidly or irregularly (palpitations)  · Have ever blacked-out, lost consciousness, or fainted    Leda last reviewed this educational content on 7/1/2019  © 0869-6122 [employer]. [address] [place] [address], [place] [address]. All rights reserved. This information is not intended as a substitute for professional medical care. Always follow your healthcare professional's instructions.              Positive

## 2022-09-20 NOTE — ASU PREOP CHECKLIST - BSA (M2)
I called the wife 2.04

## 2022-09-20 NOTE — BRIEF OPERATIVE NOTE - NSICDXBRIEFPROCEDURE_GEN_ALL_CORE_FT
PROCEDURES:  Laparoscopic revision, gastric restrictive procedure 20-Sep-2022 18:26:04 revision of gastric sleeve with lysis of adhesions Kasey Trejo

## 2022-09-21 ENCOUNTER — TRANSCRIPTION ENCOUNTER (OUTPATIENT)
Age: 57
End: 2022-09-21

## 2022-09-21 VITALS
TEMPERATURE: 98 F | RESPIRATION RATE: 18 BRPM | SYSTOLIC BLOOD PRESSURE: 130 MMHG | OXYGEN SATURATION: 100 % | HEART RATE: 81 BPM | DIASTOLIC BLOOD PRESSURE: 77 MMHG

## 2022-09-21 LAB
ANION GAP SERPL CALC-SCNC: 7 MMOL/L — SIGNIFICANT CHANGE UP (ref 5–17)
BASOPHILS # BLD AUTO: 0.01 K/UL — SIGNIFICANT CHANGE UP (ref 0–0.2)
BASOPHILS NFR BLD AUTO: 0.1 % — SIGNIFICANT CHANGE UP (ref 0–2)
BUN SERPL-MCNC: 7 MG/DL — SIGNIFICANT CHANGE UP (ref 7–23)
CALCIUM SERPL-MCNC: 8.2 MG/DL — LOW (ref 8.4–10.5)
CHLORIDE SERPL-SCNC: 101 MMOL/L — SIGNIFICANT CHANGE UP (ref 96–108)
CO2 SERPL-SCNC: 27 MMOL/L — SIGNIFICANT CHANGE UP (ref 22–31)
CREAT SERPL-MCNC: 0.63 MG/DL — SIGNIFICANT CHANGE UP (ref 0.5–1.3)
EGFR: 104 ML/MIN/1.73M2 — SIGNIFICANT CHANGE UP
EOSINOPHIL # BLD AUTO: 0 K/UL — SIGNIFICANT CHANGE UP (ref 0–0.5)
EOSINOPHIL NFR BLD AUTO: 0 % — SIGNIFICANT CHANGE UP (ref 0–6)
GLUCOSE SERPL-MCNC: 108 MG/DL — HIGH (ref 70–99)
HCT VFR BLD CALC: 36.3 % — SIGNIFICANT CHANGE UP (ref 34.5–45)
HGB BLD-MCNC: 11.8 G/DL — SIGNIFICANT CHANGE UP (ref 11.5–15.5)
IMM GRANULOCYTES NFR BLD AUTO: 0.1 % — SIGNIFICANT CHANGE UP (ref 0–0.9)
LYMPHOCYTES # BLD AUTO: 1.38 K/UL — SIGNIFICANT CHANGE UP (ref 1–3.3)
LYMPHOCYTES # BLD AUTO: 19.5 % — SIGNIFICANT CHANGE UP (ref 13–44)
MCHC RBC-ENTMCNC: 28.6 PG — SIGNIFICANT CHANGE UP (ref 27–34)
MCHC RBC-ENTMCNC: 32.5 GM/DL — SIGNIFICANT CHANGE UP (ref 32–36)
MCV RBC AUTO: 87.9 FL — SIGNIFICANT CHANGE UP (ref 80–100)
MONOCYTES # BLD AUTO: 0.52 K/UL — SIGNIFICANT CHANGE UP (ref 0–0.9)
MONOCYTES NFR BLD AUTO: 7.3 % — SIGNIFICANT CHANGE UP (ref 2–14)
NEUTROPHILS # BLD AUTO: 5.16 K/UL — SIGNIFICANT CHANGE UP (ref 1.8–7.4)
NEUTROPHILS NFR BLD AUTO: 73 % — SIGNIFICANT CHANGE UP (ref 43–77)
NRBC # BLD: 0 /100 WBCS — SIGNIFICANT CHANGE UP (ref 0–0)
PLATELET # BLD AUTO: 195 K/UL — SIGNIFICANT CHANGE UP (ref 150–400)
POTASSIUM SERPL-MCNC: 3.9 MMOL/L — SIGNIFICANT CHANGE UP (ref 3.5–5.3)
POTASSIUM SERPL-SCNC: 3.9 MMOL/L — SIGNIFICANT CHANGE UP (ref 3.5–5.3)
RBC # BLD: 4.13 M/UL — SIGNIFICANT CHANGE UP (ref 3.8–5.2)
RBC # FLD: 14 % — SIGNIFICANT CHANGE UP (ref 10.3–14.5)
SODIUM SERPL-SCNC: 135 MMOL/L — SIGNIFICANT CHANGE UP (ref 135–145)
WBC # BLD: 7.08 K/UL — SIGNIFICANT CHANGE UP (ref 3.8–10.5)
WBC # FLD AUTO: 7.08 K/UL — SIGNIFICANT CHANGE UP (ref 3.8–10.5)

## 2022-09-21 PROCEDURE — 85025 COMPLETE CBC W/AUTO DIFF WBC: CPT

## 2022-09-21 PROCEDURE — 36415 COLL VENOUS BLD VENIPUNCTURE: CPT

## 2022-09-21 PROCEDURE — 81025 URINE PREGNANCY TEST: CPT

## 2022-09-21 PROCEDURE — 80048 BASIC METABOLIC PNL TOTAL CA: CPT

## 2022-09-21 PROCEDURE — 99223 1ST HOSP IP/OBS HIGH 75: CPT

## 2022-09-21 RX ORDER — OXYCODONE HYDROCHLORIDE 5 MG/1
1 TABLET ORAL
Qty: 0 | Refills: 0 | DISCHARGE

## 2022-09-21 RX ADMIN — Medication 1000 MILLIGRAM(S): at 12:03

## 2022-09-21 RX ADMIN — Medication 400 MILLIGRAM(S): at 01:47

## 2022-09-21 RX ADMIN — ONDANSETRON 4 MILLIGRAM(S): 8 TABLET, FILM COATED ORAL at 05:36

## 2022-09-21 RX ADMIN — SODIUM CHLORIDE 150 MILLILITER(S): 9 INJECTION, SOLUTION INTRAVENOUS at 00:40

## 2022-09-21 RX ADMIN — ONDANSETRON 4 MILLIGRAM(S): 8 TABLET, FILM COATED ORAL at 00:40

## 2022-09-21 RX ADMIN — Medication 1000 MILLIGRAM(S): at 05:36

## 2022-09-21 RX ADMIN — ENOXAPARIN SODIUM 40 MILLIGRAM(S): 100 INJECTION SUBCUTANEOUS at 05:37

## 2022-09-21 RX ADMIN — Medication 400 MILLIGRAM(S): at 10:29

## 2022-09-21 RX ADMIN — Medication 800 MILLIGRAM(S): at 02:09

## 2022-09-21 RX ADMIN — Medication 400 MILLIGRAM(S): at 04:20

## 2022-09-21 NOTE — DISCHARGE NOTE NURSING/CASE MANAGEMENT/SOCIAL WORK - PATIENT PORTAL LINK FT
You can access the FollowMyHealth Patient Portal offered by [facility] by registering at the following website: [email or web address]. By joining [facility]’s FollowMyHealth portal, you will also be able to view your health information using other applications (apps) compatible with our system.

## 2022-09-21 NOTE — DISCHARGE NOTE PROVIDER - NSDCCPGOAL_GEN_ALL_CORE_FT
The patient is a 23y Female complaining of pregnancy problem.
To get better and follow your care plan as instructed.

## 2022-09-21 NOTE — DISCHARGE NOTE PROVIDER - NSDCFUADDINST_GEN_ALL_CORE_FT
Increase ambulation and utilize incentive spirometry frequently.   Apply ice packs to abdominal wall/incision sites for 20 mins on/20 mins off every 4 hours for the next 24 hours and to shoulders as needed for discomfort.   Continue Bariatric Phase 1 Diet and follow nutritional guidelines as instructed.  Pain medications as needed. If taking narcotic pain medications, may take Colace/OTC stool softener (whole) as directed to prevent constipation.  Powdered medications can be mixed in low-fat yogurt or pudding, capsules can be opened and mixed with low-fat yogurt or pudding and swallowed (not chewed) or OR crush allowed medications and put in low-fat yogurt or pudding.  Wear binder for comfort and support.  Avoid heavy lifting in excess of 20-25 pounds and strenuous activities for duration of 4-6 weeks.  Call office with any questions or concerns.

## 2022-09-21 NOTE — DISCHARGE NOTE PROVIDER - CARE PROVIDER_API CALL
Lakesha Bautista (MD)  Surgery  [address] [place] [address]  Phone: [phone]  Fax: [phone]  Follow Up Time:

## 2022-09-21 NOTE — DISCHARGE NOTE PROVIDER - NSDCMRMEDTOKEN_GEN_ALL_CORE_FT
AcipHex 20 mg oral delayed release tablet: 1 tab(s) orally 2 times a day  DULoxetine 30 mg oral delayed release capsule: 1 cap(s) orally 2 times a day  Flonase 50 mcg/inh nasal spray: 1 spray(s) nasal once a day  Lunesta 3 mg oral tablet: 1 tab(s) orally once a day (at bedtime)  Multi Vitamin+ oral liquid:   oxyCODONE 5 mg oral tablet: 1 tab(s) orally every 6 hours, As Needed - for severe pain    (meds to beds)

## 2022-09-21 NOTE — DISCHARGE NOTE PROVIDER - NSDCCPTREATMENT_GEN_ALL_CORE_FT
PRINCIPAL PROCEDURE  Procedure: Laparoscopic revision, gastric restrictive procedure  Findings and Treatment: revision of gastric sleeve with lysis of adhesions

## 2022-09-21 NOTE — DISCHARGE NOTE PROVIDER - HOSPITAL COURSE
56 yo female here for revision of gastric sleeve with Dr. Bautista.  Pt tolerated procedure well.  Recovered in PACU then transferred to the floor. Overnight patient did well.  Voiding, ambulating, passing flatus.  Pt tolerated a bariatric clears diet.  Pain well managed.

## 2022-09-21 NOTE — CONSULT NOTE ADULT - ASSESSMENT
57F  Morbid Obesity, RONALD, GERD and Hiatal Hernia admitted for aftercare following revision of Gastric Sleeve    S/P Revision of Gastric Sleeve  POD 0    Continue Bowel and pain control regimen;    Incentive Spirometer for lung expansion;   Monitor Hgb and follow up electrolytes.      RONALD  CPAP at bedtime    Diet  Regular    DVT Prophylaxis   Lovenox    Disposition  Discharge planning pending hospital course  57F  Morbid Obesity, RONALD, GERD and Hiatal Hernia admitted for aftercare following revision of Gastric Sleeve    S/P Revision of Gastric Sleeve  POD 0    Continue Bowel and pain control regimen;    Incentive Spirometer for lung expansion;   Monitor Hgb and follow up electrolytes.      RONALD  CPAP at bedtime    Diet  As per Surgery    DVT Prophylaxis   Lovenox    Disposition  Discharge planning pending hospital course   Patient medically optimized for discharge home if cleared by PT and Ortho.

## 2022-09-21 NOTE — PHARMACOTHERAPY INTERVENTION NOTE - COMMENTS
Patient is post Bariatric Sleeve  surgery. Patient was Education on current home medication administration issues and meds that were sent from [facility] by the Meds to Bed Program. .  Patient and pharmacist reviewed use and side effects of medication. Pt expressed understanding. We  also reviewed  issues of Constipation  and the best treatment.  We reviewed what medication to avoid after bariatric surgery  and Why ( avoid NSAID).  Stressed  the MAX dose of APAP and proper narcotic use and   pain treatment. We reviewed how  to avoid problems and the main  side effects.  Stressed  the importance of hydration. I explained the need to  Avoid Alcohol. I answered all pt questions on review of Ondansetron, Percocet, Omeprazole, Acetaminophen liquid, Fusion Vitamins  and Home medications

## 2022-09-21 NOTE — DISCHARGE NOTE NURSING/CASE MANAGEMENT/SOCIAL WORK - NSDCPEFALRISK_GEN_ALL_CORE
For information on Fall & Injury Prevention, visit: [email or web address] OR  [email or web address] OR  https://www.cdc.gov/steadi/patient.html

## 2022-09-21 NOTE — CONSULT NOTE ADULT - SUBJECTIVE AND OBJECTIVE BOX
HPI: 57F Morbid Obesity, RONALD, GERD and Hiatal Hernia initially had a Lap Gastric Sleeve in 2015.  Patient here for revision of this procedure and endoscopic repair of a pre-existing hiatal hernia.  Currently patient is resting comfortably in the recovery room.    REVIEW OF SYSTEMS:  CONSTITUTIONAL: No fever, weight loss, or fatigue  EYES: No eye pain, visual disturbances, or discharge  ENMT:  No difficulty hearing, tinnitus, vertigo; No sinus or throat pain  NECK: No pain or stiffness  RESPIRATORY: No cough, wheezing, chills or hemoptysis; No shortness of breath  CARDIOVASCULAR: No chest pain, palpitations, dizziness, or leg swelling  GASTROINTESTINAL: No abdominal or epigastric pain. No nausea, vomiting, or hematemesis; No diarrhea or constipation. No melena or hematochezia.  GENITOURINARY: No dysuria, frequency, hematuria, or incontinence  NEUROLOGICAL: No headaches, memory loss, loss of strength, numbness, or tremors  MUSCULOSKELETAL: No muscle or back pain      PAST MEDICAL & SURGICAL HISTORY:  GERD (gastroesophageal reflux disease)      Seasonal allergies      Sleep apnea      COVID-19 vaccine series completed      Hiatal hernia      Dysphagia      Lumbar disc herniation      Chronic back pain      Insomnia      Morbid obesity with BMI of 40.0-44.9, adult      History of 2019 novel coronavirus disease (COVID-19)      S/P partial thyroidectomy  2012      Cold thyroid nodule  thyroidectomy      H/O gastric sleeve  2015      History of tonsillectomy  childhood          SOCIAL HISTORY:  Tobacco; EtOH; Illicit Drugs    Allergies    No Known Allergies    Intolerances        MEDICATIONS  (STANDING):  enoxaparin Injectable 40 milliGRAM(s) SubCutaneous every 12 hours  lactated ringers. 1000 milliLiter(s) (150 mL/Hr) IV Continuous <Continuous>  lactated ringers. 1000 milliLiter(s) (150 mL/Hr) IV Continuous <Continuous>  lactated ringers. 2000 milliLiter(s) (1000 mL/Hr) IV Continuous <Continuous>  ondansetron Injectable 4 milliGRAM(s) IV Push every 6 hours  pantoprazole  Injectable 40 milliGRAM(s) IV Push daily    MEDICATIONS  (PRN):  acetaminophen   IVPB .. 1000 milliGRAM(s) IV Intermittent every 6 hours PRN Mild Pain (1 - 3)  HYDROmorphone  Injectable 0.5 milliGRAM(s) IV Push every 4 hours PRN Severe Pain (7 - 10)  hyoscyamine SL 0.125 milliGRAM(s) SubLingual every 6 hours PRN Nausea and/or vomiting  ibuprofen IVPB .. 800 milliGRAM(s) IV Intermittent every 6 hours PRN Moderate Pain (4 - 6)      FAMILY HISTORY:  Family history of pancreatic cancer (Father)    Family history of ovarian cancer (Mother)        Vital Signs Last 24 Hrs  T(C): 36.7 (21 Sep 2022 07:39), Max: 37.1 (21 Sep 2022 02:11)  T(F): 98.1 (21 Sep 2022 07:39), Max: 98.7 (21 Sep 2022 02:11)  HR: 81 (21 Sep 2022 07:39) (80 - 90)  BP: 130/77 (21 Sep 2022 07:39) (99/49 - 130/77)  BP(mean): --  RR: 18 (21 Sep 2022 07:39) (12 - 19)  SpO2: 100% (21 Sep 2022 07:39) (96% - 100%)    Parameters below as of 21 Sep 2022 07:39  Patient On (Oxygen Delivery Method): room air        PHYSICAL EXAM:    GENERAL: NAD, well-developed  HEAD:  Atraumatic, Normocephalic  EYES: EOMI, PERRLA, conjunctiva and sclera clear  ENMT: No tonsillar erythema, exudates, or enlargement; Moist mucous membranes, Good dentition, No lesions  NECK: Supple, No JVD, Normal thyroid  NERVOUS SYSTEM:  Alert & Oriented X3, Good concentration;  CHEST/LUNG: Clear to auscultation bilaterally; No rales, rhonchi, wheezing, or rubs  HEART: Regular rate and rhythm; No murmurs, rubs, or gallops  ABDOMEN: Soft, Nontender, Nondistended; Bowel sounds present  EXTREMITIES:  2+ Peripheral Pulses, No clubbing, cyanosis, or edema      LABS:                        11.8   7.08  )-----------( 195      ( 21 Sep 2022 06:30 )             36.3     09-21    135  |  101  |  7   ----------------------------<  108<H>  3.9   |  27  |  0.63    Ca    8.2<L>      21 Sep 2022 06:30          CAPILLARY BLOOD GLUCOSE          RADIOLOGY & ADDITIONAL STUDIES:    EKG:   Personally Reviewed:  [ ] YES     Imaging:   Personally Reviewed:  [ ] YES     Consultant(s) Notes Reviewed:      Care Discussed with Consultants/Other Providers:

## 2022-09-21 NOTE — DISCHARGE NOTE PROVIDER - NSDCFUSCHEDAPPT_GEN_ALL_CORE_FT
Lakesha Bautista  [facility] Physician [facility]  BARIATRIC [address]  Scheduled Appointment: 09/27/2022    Lakesha Bautista  [facility]  BARIATRIC [address]  Scheduled Appointment: 10/21/2022    [facility]  WEIGHTMGMT [address]  Scheduled Appointment: 11/22/2022

## 2022-09-22 ENCOUNTER — NON-APPOINTMENT (OUTPATIENT)
Age: 57
End: 2022-09-22

## 2022-09-27 ENCOUNTER — APPOINTMENT (OUTPATIENT)
Dept: BARIATRICS | Facility: CLINIC | Age: 57
End: 2022-09-27

## 2022-09-27 VITALS
HEIGHT: 63 IN | DIASTOLIC BLOOD PRESSURE: 72 MMHG | HEART RATE: 83 BPM | BODY MASS INDEX: 40.12 KG/M2 | TEMPERATURE: 97 F | OXYGEN SATURATION: 98 % | WEIGHT: 226.41 LBS | SYSTOLIC BLOOD PRESSURE: 124 MMHG

## 2022-09-27 PROCEDURE — 99024 POSTOP FOLLOW-UP VISIT: CPT

## 2022-09-27 RX ORDER — OXYCODONE 5 MG/1
5 TABLET ORAL
Qty: 8 | Refills: 0 | Status: DISCONTINUED | COMMUNITY
Start: 2022-07-22 | End: 2022-09-27

## 2022-09-27 RX ORDER — ONDANSETRON 4 MG/1
4 TABLET, ORALLY DISINTEGRATING ORAL EVERY 8 HOURS
Qty: 15 | Refills: 0 | Status: DISCONTINUED | COMMUNITY
Start: 2022-09-06 | End: 2022-09-27

## 2022-09-27 RX ORDER — OXYCODONE 5 MG/1
5 TABLET ORAL
Qty: 6 | Refills: 0 | Status: DISCONTINUED | COMMUNITY
Start: 2022-09-06 | End: 2022-09-27

## 2022-10-05 ENCOUNTER — APPOINTMENT (OUTPATIENT)
Dept: BARIATRICS/WEIGHT MGMT | Facility: CLINIC | Age: 57
End: 2022-10-05

## 2022-10-12 NOTE — ASSESSMENT
[FreeTextEntry1] : 57-yr-old female 1 week s/p laparoscopic revision of sleeve gastrectomy for reflux. Pt. feels good and is doing excellently.\par \par Encourage protein focus meals, 60 g protein per day advance to soft diet in 1 week\par Encourage fluid intake 64 ounces of water per day\par Increase activity with walking and then strength training to begin at 6 weeks postop\par Follow up with a nutritionist\par Continue Aciphex\par Follow up in 1 month\par \par Call with any questions or concerns\par \par

## 2022-10-12 NOTE — HISTORY OF PRESENT ILLNESS
[Date of Surgery: ___] : Date of Surgery:   [unfilled] [Surgeon Name:   ___] : Surgeon Name: Dr. MOURA [Pre-Op Weight ___] : Pre-op weight was [unfilled] lbs [Procedure: ___] : Procedure performed: [unfilled]  [de-identified] : Pt. is a 57-yr-old female 1 week s/p laparoscopic revision  sleeve gastrectomy for reflux. Pt. feels good and is doing excellently.  No nausea, fever, or chills. No constipation, diarrhea, or reflux. No leg pain. Pt. has Right sided abdominal muscle spasm that she treats with heat and ice 4x/day. Pt. stopped taking Oxycodone and Ondansetron. Pt. is walking 10 minutes/day. drinking 64 oz of water/day, and is drinking 2 nutrition shakes/day. Pt. plans to return to work next week.\par \par  [de-identified] : laparoscopic sleeve gastrectomy, May 2015, Dr. Yates; initial weight-278lbs\par

## 2022-10-12 NOTE — PHYSICAL EXAM
[Obese, well nourished, in no acute distress] : obese, well nourished, in no acute distress [Normal] : affect appropriate [de-identified] : soft, NT, ND, incisions healing well

## 2022-10-20 ENCOUNTER — APPOINTMENT (OUTPATIENT)
Dept: BARIATRICS | Facility: CLINIC | Age: 57
End: 2022-10-20
Payer: COMMERCIAL

## 2022-10-20 ENCOUNTER — NON-APPOINTMENT (OUTPATIENT)
Age: 57
End: 2022-10-20

## 2022-10-20 VITALS
DIASTOLIC BLOOD PRESSURE: 70 MMHG | HEIGHT: 63 IN | BODY MASS INDEX: 39.53 KG/M2 | SYSTOLIC BLOOD PRESSURE: 120 MMHG | WEIGHT: 223.1 LBS | OXYGEN SATURATION: 98 % | TEMPERATURE: 96.7 F | HEART RATE: 102 BPM

## 2022-10-20 DIAGNOSIS — K21.9 GASTRO-ESOPHAGEAL REFLUX DISEASE W/OUT ESOPHAGITIS: ICD-10-CM

## 2022-10-20 PROCEDURE — 99024 POSTOP FOLLOW-UP VISIT: CPT

## 2022-10-21 PROBLEM — Z86.16 PERSONAL HISTORY OF COVID-19: Chronic | Status: ACTIVE | Noted: 2022-08-31

## 2023-02-14 ENCOUNTER — APPOINTMENT (OUTPATIENT)
Dept: BARIATRICS | Facility: CLINIC | Age: 58
End: 2023-02-14
Payer: COMMERCIAL

## 2023-02-14 VITALS
TEMPERATURE: 96.7 F | SYSTOLIC BLOOD PRESSURE: 120 MMHG | WEIGHT: 231.92 LBS | OXYGEN SATURATION: 98 % | HEIGHT: 63 IN | HEART RATE: 104 BPM | DIASTOLIC BLOOD PRESSURE: 64 MMHG | BODY MASS INDEX: 41.09 KG/M2

## 2023-02-14 PROCEDURE — 99214 OFFICE O/P EST MOD 30 MIN: CPT

## 2023-02-14 RX ORDER — DULOXETINE HYDROCHLORIDE 30 MG/1
30 CAPSULE, DELAYED RELEASE PELLETS ORAL DAILY
Refills: 0 | Status: ACTIVE | COMMUNITY
Start: 2021-10-19

## 2023-02-14 RX ORDER — ALPRAZOLAM 0.25 MG/1
0.25 TABLET ORAL
Qty: 50 | Refills: 0 | Status: DISCONTINUED | COMMUNITY
Start: 2021-10-05 | End: 2023-02-14

## 2023-02-14 RX ORDER — ESZOPICLONE 3 MG/1
3 TABLET, COATED ORAL
Refills: 0 | Status: DISCONTINUED | COMMUNITY
Start: 2021-10-19 | End: 2023-02-14

## 2023-02-14 RX ORDER — CALCIUM CARBONATE/VITAMIN D3 600 MG-20
TABLET ORAL DAILY
Refills: 0 | Status: ACTIVE | COMMUNITY

## 2023-02-14 RX ORDER — RABEPRAZOLE SODIUM 20 MG/1
20 TABLET, DELAYED RELEASE ORAL
Qty: 60 | Refills: 1 | Status: DISCONTINUED | COMMUNITY
Start: 2021-10-19 | End: 2023-02-14

## 2023-02-14 RX ORDER — RABEPRAZOLE SODIUM 20 MG/1
20 TABLET, DELAYED RELEASE ORAL DAILY
Qty: 30 | Refills: 3 | Status: DISCONTINUED | COMMUNITY
End: 2023-02-14

## 2023-02-14 RX ORDER — MULTIVIT-MIN/IRON/FOLIC ACID/K 45-800-120
CAPSULE ORAL DAILY
Refills: 0 | Status: ACTIVE | COMMUNITY

## 2023-02-15 RX ORDER — RABEPRAZOLE SODIUM 20 MG/1
20 TABLET, DELAYED RELEASE ORAL DAILY
Qty: 30 | Refills: 3 | Status: ACTIVE | COMMUNITY
Start: 2023-02-15 | End: 1900-01-01

## 2023-02-15 RX ORDER — RABEPRAZOLE SODIUM 20 MG/1
20 TABLET, DELAYED RELEASE ORAL DAILY
Qty: 30 | Refills: 3 | Status: DISCONTINUED | COMMUNITY
Start: 2023-02-14 | End: 2023-02-15

## 2023-02-20 DIAGNOSIS — E87.5 HYPERKALEMIA: ICD-10-CM

## 2023-02-23 NOTE — ASSESSMENT
[FreeTextEntry1] : 57 year old woman ~5 months s/p Revision Laparoscopic Sleeve Gastrectomy. Patient is doing well. Nutrition and exercise guidelines reviewed with the patient. \par \par Continue protein focused meals, 3 meals a day\par Keep a food log\par Encourage zero calorie fluid intake (64 oz/day)\par Continue bariatric vitamins (take iron supplement)\par Encourage exercise with cardio (aim for 8k-10k steps/day), and strength training 2x/week\par Use step counter\par Weigh yourself 1x-2x/week\par Follow-up with nutritionist \par Follow-up in 2  onths\par All questions answered\par \par Call with any questions or concerns\par \par \par Time before and after visit spent reviewing chart

## 2023-02-23 NOTE — HISTORY OF PRESENT ILLNESS
[Procedure: ___] : Procedure performed: [unfilled]  [Date of Surgery: ___] : Date of Surgery:   [unfilled] [Surgeon Name:   ___] : Surgeon Name: Dr. MOURA [Pre-Op Weight ___] : Pre-op weight was [unfilled] lbs [de-identified] : 57 year old woman ~5 months s/p Revision Laparoscopic Sleeve Gastrectomy. Patient is doing well. Reports no abdominal pain, nausea/vomiting, constipation, diarrhea, reflux/heartburn. Patient reports epigastric pressure with meals, both solids and liquids and with liquids particularly when eating dry foods or eating and drinking too quickly. Patient eating 3 protein-rich meals/day, drinking adequate zero-calorie fluids/day, taking bariatric vitamins, and exercising with bands. \par \par Next nutritionist appointment 2/28/2023 [de-identified] : Laparoscopic Sleeve Gastrectomy, 5/2015, Dr. Michela Dawkins, 278 lbs

## 2023-02-23 NOTE — PHYSICAL EXAM
[Normal] : affect appropriate [de-identified] : soft, NT, ND, no evidence of hernia or diastasis, incisions well-healed

## 2023-02-24 LAB
25(OH)D3 SERPL-MCNC: 55.3 NG/ML
ALBUMIN SERPL ELPH-MCNC: 4.1 G/DL
ALP BLD-CCNC: 64 U/L
ALT SERPL-CCNC: 25 U/L
ANION GAP SERPL CALC-SCNC: 13 MMOL/L
AST SERPL-CCNC: 22 U/L
BASOPHILS # BLD AUTO: 0.04 K/UL
BASOPHILS NFR BLD AUTO: 0.7 %
BILIRUB SERPL-MCNC: 0.2 MG/DL
BUN SERPL-MCNC: 16 MG/DL
CALCIUM SERPL-MCNC: 9 MG/DL
CHLORIDE SERPL-SCNC: 99 MMOL/L
CO2 SERPL-SCNC: 25 MMOL/L
CREAT SERPL-MCNC: 0.64 MG/DL
EGFR: 104 ML/MIN/1.73M2
EOSINOPHIL # BLD AUTO: 0.08 K/UL
EOSINOPHIL NFR BLD AUTO: 1.4 %
ESTIMATED AVERAGE GLUCOSE: 120 MG/DL
FERRITIN SERPL-MCNC: 51 NG/ML
FOLATE SERPL-MCNC: 16 NG/ML
GLUCOSE SERPL-MCNC: 66 MG/DL
HBA1C MFR BLD HPLC: 5.8 %
HCT VFR BLD CALC: 39.6 %
HGB BLD-MCNC: 12.5 G/DL
IMM GRANULOCYTES NFR BLD AUTO: 1.7 %
IRON SERPL-MCNC: 60 UG/DL
LYMPHOCYTES # BLD AUTO: 2.11 K/UL
LYMPHOCYTES NFR BLD AUTO: 36.8 %
MAN DIFF?: NORMAL
MCHC RBC-ENTMCNC: 28.2 PG
MCHC RBC-ENTMCNC: 31.6 GM/DL
MCV RBC AUTO: 89.4 FL
MONOCYTES # BLD AUTO: 0.43 K/UL
MONOCYTES NFR BLD AUTO: 7.5 %
NEUTROPHILS # BLD AUTO: 2.98 K/UL
NEUTROPHILS NFR BLD AUTO: 51.9 %
PLATELET # BLD AUTO: 224 K/UL
POTASSIUM SERPL-SCNC: 4 MMOL/L
POTASSIUM SERPL-SCNC: 6.2 MMOL/L
PROT SERPL-MCNC: 6.6 G/DL
RBC # BLD: 4.43 M/UL
RBC # FLD: 14.2 %
SODIUM SERPL-SCNC: 137 MMOL/L
TSH SERPL-ACNC: 0.68 UIU/ML
VIT B1 SERPL-MCNC: 144.1 NMOL/L
VIT B12 SERPL-MCNC: 940 PG/ML
WBC # FLD AUTO: 5.74 K/UL
ZINC SERPL-MCNC: 76 UG/DL

## 2023-02-28 ENCOUNTER — APPOINTMENT (OUTPATIENT)
Dept: BARIATRICS/WEIGHT MGMT | Facility: CLINIC | Age: 58
End: 2023-02-28
Payer: COMMERCIAL

## 2023-02-28 DIAGNOSIS — Z98.84 BARIATRIC SURGERY STATUS: ICD-10-CM

## 2023-02-28 DIAGNOSIS — E66.01 MORBID (SEVERE) OBESITY DUE TO EXCESS CALORIES: ICD-10-CM

## 2023-02-28 PROCEDURE — 97803 MED NUTRITION INDIV SUBSEQ: CPT | Mod: 95

## 2023-03-01 VITALS — HEIGHT: 63 IN | BODY MASS INDEX: 40.93 KG/M2 | WEIGHT: 231 LBS

## 2023-03-01 PROBLEM — Z98.84 S/P LAPAROSCOPIC SLEEVE GASTRECTOMY: Status: ACTIVE | Noted: 2020-10-28

## 2023-03-01 PROBLEM — E66.01 MORBID OBESITY: Status: ACTIVE | Noted: 2022-02-01

## 2023-03-06 LAB — VIT A SERPL-MCNC: 39.5 UG/DL

## 2023-03-27 ENCOUNTER — OFFICE (OUTPATIENT)
Dept: URBAN - METROPOLITAN AREA CLINIC 1 | Facility: CLINIC | Age: 58
Setting detail: OPHTHALMOLOGY
End: 2023-03-27
Payer: COMMERCIAL

## 2023-03-27 DIAGNOSIS — H16.223: ICD-10-CM

## 2023-03-27 DIAGNOSIS — H25.13: ICD-10-CM

## 2023-03-27 DIAGNOSIS — H43.393: ICD-10-CM

## 2023-03-27 DIAGNOSIS — H52.4: ICD-10-CM

## 2023-03-27 PROBLEM — H02.834 DERMATOCHALASIS; RIGHT UPPER LID, LEFT UPPER LID: Status: ACTIVE | Noted: 2023-03-27

## 2023-03-27 PROBLEM — H02.831 DERMATOCHALASIS; RIGHT UPPER LID, LEFT UPPER LID: Status: ACTIVE | Noted: 2023-03-27

## 2023-03-27 PROCEDURE — 92015 DETERMINE REFRACTIVE STATE: CPT | Performed by: OPHTHALMOLOGY

## 2023-03-27 PROCEDURE — 92014 COMPRE OPH EXAM EST PT 1/>: CPT | Performed by: OPHTHALMOLOGY

## 2023-03-27 ASSESSMENT — KERATOMETRY
OS_K2POWER_DIOPTERS: 45.50
OD_AXISANGLE_DEGREES: 062
OD_K1POWER_DIOPTERS: 45.00
OS_K1POWER_DIOPTERS: 44.75
OD_K2POWER_DIOPTERS: 46.25
OS_AXISANGLE_DEGREES: 113

## 2023-03-27 ASSESSMENT — REFRACTION_CURRENTRX
OS_AXIS: 119
OS_SPHERE: -2.00
OD_SPHERE: -1.75
OS_OVR_VA: 20/
OS_SPHERE: -1.75
OS_ADD: +1.75
OD_OVR_VA: 20/
OD_VPRISM_DIRECTION: PROGS
OD_SPHERE: -2.25
OD_OVR_VA: 20/
OD_ADD: +1.75
OS_OVR_VA: 20/
OS_CYLINDER: -0.25
OS_VPRISM_DIRECTION: PROGS

## 2023-03-27 ASSESSMENT — REFRACTION_AUTOREFRACTION
OS_AXIS: 055
OD_SPHERE: -1.50
OS_CYLINDER: -1.00
OD_CYLINDER: -1.50
OD_AXIS: 135
OS_SPHERE: -1.50

## 2023-03-27 ASSESSMENT — REFRACTION_MANIFEST
OS_VA1: 20/25-1
OS_ADD: +2.25
OD_ADD: +2.25
OD_AXIS: 135
OS_SPHERE: -1.50
OS_CYLINDER: -0.50
OS_AXIS: 055
OD_SPHERE: -1.50
OD_VA1: 20/20
OD_CYLINDER: -0.75

## 2023-03-27 ASSESSMENT — SPHEQUIV_DERIVED
OS_SPHEQUIV: -2
OD_SPHEQUIV: -2.25
OS_SPHEQUIV: -1.75
OD_SPHEQUIV: -1.875

## 2023-03-27 ASSESSMENT — CONFRONTATIONAL VISUAL FIELD TEST (CVF)
OD_FINDINGS: FULL
OS_FINDINGS: FULL

## 2023-03-27 ASSESSMENT — VISUAL ACUITY
OS_BCVA: 20/30
OD_BCVA: 20/30

## 2023-03-27 ASSESSMENT — LID POSITION - DERMATOCHALASIS
OD_DERMATOCHALASIS: 1+
OS_DERMATOCHALASIS: 1+

## 2023-03-27 ASSESSMENT — AXIALLENGTH_DERIVED
OD_AL: 23.542
OS_AL: 23.6771
OD_AL: 23.6884
OS_AL: 23.7757

## 2023-03-27 ASSESSMENT — TONOMETRY
OS_IOP_MMHG: 19
OD_IOP_MMHG: 17

## 2023-03-27 ASSESSMENT — SUPERFICIAL PUNCTATE KERATITIS (SPK)
OD_SPK: 1+
OS_SPK: 1+

## 2023-04-24 ENCOUNTER — RX ONLY (RX ONLY)
Age: 58
End: 2023-04-24

## 2023-04-24 ENCOUNTER — OFFICE (OUTPATIENT)
Dept: URBAN - METROPOLITAN AREA CLINIC 1 | Facility: CLINIC | Age: 58
Setting detail: OPHTHALMOLOGY
End: 2023-04-24
Payer: COMMERCIAL

## 2023-04-24 DIAGNOSIS — H02.834: ICD-10-CM

## 2023-04-24 DIAGNOSIS — H25.13: ICD-10-CM

## 2023-04-24 DIAGNOSIS — H16.223: ICD-10-CM

## 2023-04-24 DIAGNOSIS — H02.831: ICD-10-CM

## 2023-04-24 PROCEDURE — 99213 OFFICE O/P EST LOW 20 MIN: CPT | Performed by: OPHTHALMOLOGY

## 2023-04-24 ASSESSMENT — REFRACTION_CURRENTRX
OD_SPHERE: -2.25
OS_CYLINDER: -0.25
OD_ADD: +1.75
OD_OVR_VA: 20/
OS_VPRISM_DIRECTION: PROGS
OS_ADD: +1.75
OD_OVR_VA: 20/
OS_OVR_VA: 20/
OD_VPRISM_DIRECTION: PROGS
OD_SPHERE: -1.75
OS_OVR_VA: 20/
OS_SPHERE: -1.75
OS_AXIS: 119
OS_SPHERE: -2.00

## 2023-04-24 ASSESSMENT — AXIALLENGTH_DERIVED
OD_AL: 23.6
OS_AL: 23.6771
OS_AL: 23.875
OD_AL: 23.542

## 2023-04-24 ASSESSMENT — REFRACTION_MANIFEST
OD_ADD: +2.25
OD_CYLINDER: -0.75
OD_VA1: 20/20
OS_AXIS: 055
OS_SPHERE: -1.50
OD_SPHERE: -1.50
OD_AXIS: 135
OS_CYLINDER: -0.50
OS_ADD: +2.25
OS_VA1: 20/25-1

## 2023-04-24 ASSESSMENT — KERATOMETRY
OD_AXISANGLE_DEGREES: 66
OD_K1POWER_DIOPTERS: 45.00
OD_K2POWER_DIOPTERS: 46.25
METHOD_AUTO_MANUAL: AUTO
OS_K1POWER_DIOPTERS: 44.75
OS_K2POWER_DIOPTERS: 45.50
OS_AXISANGLE_DEGREES: 117

## 2023-04-24 ASSESSMENT — LID POSITION - DERMATOCHALASIS
OD_DERMATOCHALASIS: 1+
OS_DERMATOCHALASIS: 1+

## 2023-04-24 ASSESSMENT — REFRACTION_AUTOREFRACTION
OD_SPHERE: -1.27
OS_AXIS: 49
OD_CYLINDER: -1.50
OD_AXIS: 135
OS_SPHERE: -1.75
OS_CYLINDER: -1.00

## 2023-04-24 ASSESSMENT — TONOMETRY
OS_IOP_MMHG: 18
OD_IOP_MMHG: 18

## 2023-04-24 ASSESSMENT — CONFRONTATIONAL VISUAL FIELD TEST (CVF)
OD_FINDINGS: FULL
OS_FINDINGS: FULL

## 2023-04-24 ASSESSMENT — SPHEQUIV_DERIVED
OD_SPHEQUIV: -1.875
OD_SPHEQUIV: -2.02
OS_SPHEQUIV: -1.75
OS_SPHEQUIV: -2.25

## 2023-04-24 ASSESSMENT — VISUAL ACUITY
OD_BCVA: 20/25-1
OS_BCVA: 20/20

## 2023-04-24 ASSESSMENT — SUPERFICIAL PUNCTATE KERATITIS (SPK)
OD_SPK: 1+
OS_SPK: 1+

## 2023-05-10 NOTE — PHYSICAL EXAM
[de-identified] : breathing comfortable on room air [de-identified] : soft, non distended, non tender. All incisions well healed. No signs of infection. No hernia noted.

## 2023-05-10 NOTE — ASSESSMENT
[de-identified] : 57-yr-old female 1 month s/p laparoscopic revision of sleeve gastrectomy for reflux. The patient is doing well overall, with resolution of her symptoms and able to tolerate soft/pureed food.\par - Encourage protein focus meals, 60 g protein per day \par - Can advance to more textured food as tolerated\par - Encourage fluid intake 64 ounces of water per day\par - Increase activity with walking and then strength training to begin in 2 weeks\par - Follow up with a nutritionist as planned (11/22/2022)\par - Continue Aciphex only once a day\par - Follow up in 1 month

## 2023-05-10 NOTE — HISTORY OF PRESENT ILLNESS
[___ Months Post Op] : [unfilled] months [Walking] : walking [de-identified] : Procedure Details: Procedure performed: laparoscopic revision of sleeve gastrectomy, Date of Surgery: 9/20/2022, Surgeon Name: Dr. Bautista. Pre-op weight was ~240 lbs. laparoscopic sleeve gastrectomy, May 2015, Dr. Yates; initial weight-278lbs [de-identified] : 57-yo female 1 month s/p laparoscopic revision sleeve gastrectomy for reflux (9/20/22). Doing well since surgery. Denies nausea, fever, or chills. No constipation, diarrhea, or reflux. No leg pain. Abbdominal muscle spasm resolved. Reports no pain. Tolerating soft/pureed food. She is also engaging some exercise and drinking adequate amount of liquids. [FreeTextEntry1] : Pureed/ soft diet

## 2023-05-11 ENCOUNTER — NON-APPOINTMENT (OUTPATIENT)
Age: 58
End: 2023-05-11

## 2023-05-11 NOTE — HISTORY OF PRESENT ILLNESS
[Procedure: ___] : Procedure performed: [unfilled]  [Date of Surgery: ___] : Date of Surgery:   [unfilled] [Surgeon Name:   ___] : Surgeon Name: Dr. MOURA [Pre-Op Weight ___] : Pre-op weight was [unfilled] lbs [de-identified] : Lowest weight - 211 lbs.

## 2023-05-11 NOTE — PHYSICAL EXAM
[Obese, well nourished, in no acute distress] : obese, well nourished, in no acute distress [Normal] : affect appropriate [de-identified] : obese, soft, nontender, no evidence of hernia

## 2023-05-11 NOTE — ASSESSMENT
[FreeTextEntry1] : 57-year-old woman 7 years status post laparoscopic sleeve gastrectomy developed significant recent reflux over the last several years.  Patient had full GI work-up.  Patient was scheduled to have revisional surgery possible gastric bypass but unfortunately patient developed COVID.  Patient is now COVID symptom-free and wishes to be rescheduled for surgery.  Procedure, risk, benefits, and alternatives were again discussed with patient.  Patient understands that if we proceed with hiatal hernia repair and sleeve revision, reflux could return requiring conversion to gastric bypass.  Patient understands and wishes to proceed\par \par Reschedule surgery\par Medical clearance\par Preoperative modified liquid diet for 2 weeks\par Presurgical testing\par Call with any questions or concern\par \par Time spent before and after visit reviewing chart

## 2023-05-23 ENCOUNTER — APPOINTMENT (OUTPATIENT)
Dept: BARIATRICS | Facility: CLINIC | Age: 58
End: 2023-05-23

## 2023-06-12 ENCOUNTER — OFFICE (OUTPATIENT)
Dept: URBAN - METROPOLITAN AREA CLINIC 1 | Facility: CLINIC | Age: 58
Setting detail: OPHTHALMOLOGY
End: 2023-06-12
Payer: COMMERCIAL

## 2023-06-12 DIAGNOSIS — H02.831: ICD-10-CM

## 2023-06-12 DIAGNOSIS — H10.45: ICD-10-CM

## 2023-06-12 DIAGNOSIS — H25.13: ICD-10-CM

## 2023-06-12 DIAGNOSIS — H02.834: ICD-10-CM

## 2023-06-12 DIAGNOSIS — H16.223: ICD-10-CM

## 2023-06-12 PROCEDURE — 99213 OFFICE O/P EST LOW 20 MIN: CPT | Performed by: OPHTHALMOLOGY

## 2023-06-12 ASSESSMENT — TONOMETRY
OD_IOP_MMHG: 18
OS_IOP_MMHG: 18

## 2023-06-12 ASSESSMENT — REFRACTION_AUTOREFRACTION
OD_AXIS: 135
OS_SPHERE: -1.50
OD_SPHERE: -1.25
OD_CYLINDER: -1.50
OS_AXIS: 60
OS_CYLINDER: -1.00

## 2023-06-12 ASSESSMENT — KERATOMETRY
OD_K2POWER_DIOPTERS: 46.00
OD_K1POWER_DIOPTERS: 45.00
OS_K1POWER_DIOPTERS: 44.75
OS_AXISANGLE_DEGREES: 122
OD_AXISANGLE_DEGREES: 62
OS_K2POWER_DIOPTERS: 45.25
METHOD_AUTO_MANUAL: AUTO

## 2023-06-12 ASSESSMENT — REFRACTION_CURRENTRX
OD_OVR_VA: 20/
OS_AXIS: 119
OS_OVR_VA: 20/
OD_SPHERE: -2.25
OS_ADD: +1.75
OS_VPRISM_DIRECTION: PROGS
OD_SPHERE: -1.75
OD_OVR_VA: 20/
OD_ADD: +1.75
OS_CYLINDER: -0.25
OS_SPHERE: -1.75
OD_VPRISM_DIRECTION: PROGS
OS_SPHERE: -2.00
OS_OVR_VA: 20/

## 2023-06-12 ASSESSMENT — AXIALLENGTH_DERIVED
OS_AL: 23.7235
OD_AL: 23.5878
OS_AL: 23.8224
OD_AL: 23.6366

## 2023-06-12 ASSESSMENT — SPHEQUIV_DERIVED
OD_SPHEQUIV: -2
OS_SPHEQUIV: -2
OS_SPHEQUIV: -1.75
OD_SPHEQUIV: -1.875

## 2023-06-12 ASSESSMENT — LID POSITION - DERMATOCHALASIS
OD_DERMATOCHALASIS: 1+
OS_DERMATOCHALASIS: 1+

## 2023-06-12 ASSESSMENT — VISUAL ACUITY
OS_BCVA: 20/25+
OD_BCVA: 20/25-

## 2023-06-12 ASSESSMENT — REFRACTION_MANIFEST
OD_VA1: 20/20
OS_SPHERE: -1.50
OD_SPHERE: -1.50
OD_ADD: +2.25
OS_ADD: +2.25
OS_CYLINDER: -0.50
OD_CYLINDER: -0.75
OS_VA1: 20/25-1
OS_AXIS: 055
OD_AXIS: 135

## 2023-06-12 ASSESSMENT — SUPERFICIAL PUNCTATE KERATITIS (SPK)
OS_SPK: 1+
OD_SPK: 1+

## 2023-06-12 ASSESSMENT — CONFRONTATIONAL VISUAL FIELD TEST (CVF)
OD_FINDINGS: FULL
OS_FINDINGS: FULL

## 2023-06-19 NOTE — PRE PROCEDURE NOTE - PRE PROCEDURE EVALUATION
Pre-Endoscopy Evaluation    Attending Physician:  Dr. Burns    Procedure: EGD + EndoFLIP + 24 hr pH impedance    Indication for Procedure:    Pertinent History: See [employer] chart    PAST MEDICAL & SURGICAL HISTORY:  GERD (gastroesophageal reflux disease)    Seasonal allergies    S/P partial thyroidectomy    Cold thyroid nodule  thyroidectomy        Allergies    No Known Allergies    Intolerances        Medications: MEDICATIONS  (STANDING):    MEDICATIONS  (PRN):      Pertinent lab data:                      Physical Examination:  Daily     Daily   Vital Signs Last 24 Hrs  T(C): --  T(F): --  HR: --  BP: --  BP(mean): --  RR: --  SpO2: --  GENERAL: no acute distress  NEURO: alert  HEENT: anicteric sclera, no conjunctival pallor appreciated  CHEST: no respiratory distress, no accessory muscle use  CARDIAC: regular rate, +S1/S2  ABDOMEN: soft, nondistended, nontender, no rebound or guarding  EXTREMITIES: warm, well perfused, no edema  PSYCH: normal mood, normal affect  SKIN: no lesions noted    Comments:    ASA Class: I []  II [x]  III []  IV []    The patient is a suitable candidate for the planned procedure unless box checked [ ]  No, explain:  
No

## 2024-12-11 ENCOUNTER — NON-APPOINTMENT (OUTPATIENT)
Age: 59
End: 2024-12-11

## (undated) DEVICE — TUBING SUCTION 20FT

## (undated) DEVICE — SUT SOFSILK 2-0 18" C-23

## (undated) DEVICE — SUT POLYSORB 0 30" GU-46

## (undated) DEVICE — SHEARS HARMONIC ACE PLUS 7 5MM X 36CM CURVED TIP

## (undated) DEVICE — SYR LUER LOK 10CC

## (undated) DEVICE — GOWN TRIMAX LG

## (undated) DEVICE — SUT POLYSORB 3-0 30" V-20 UNDYED

## (undated) DEVICE — LIGASURE MARYLAND JAW LAPAROSCOPIC SEALER 37CM

## (undated) DEVICE — BLADE SAFETY LOCK #15

## (undated) DEVICE — DRSG STERISTRIPS 0.5X4"

## (undated) DEVICE — SUT POLYSORB 0 60" TIES UNDYED

## (undated) DEVICE — SUT MAXON 4-0 30" P-12

## (undated) DEVICE — TROCAR COVIDIEN VISIPORT PLUS 5-12MM

## (undated) DEVICE — NDL HYPO REGULAR BEVEL 22G X 1.5"

## (undated) DEVICE — TUBING STRYKER PNEUMOCLEAR HIGH FLOW

## (undated) DEVICE — TROCAR COVIDIEN ENDO CLOSE SUTURING DEVICE

## (undated) DEVICE — STAPLER COVIDIEN ENDO GIA SHORT HANDLE

## (undated) DEVICE — FOLEY TRAY 16FR 5CC LF LUBRISIL ADVANCE TEMP CLOSED

## (undated) DEVICE — SUT POLYSORB 2-0 30" V-20 UNDYED

## (undated) DEVICE — ELCTR EDGE BOVIE INSULATED BLADE TIP 2.75"

## (undated) DEVICE — PREP CHLORAPREP ORANGE 2PCT 26ML

## (undated) DEVICE — PACK MAJOR ABDOMINAL SUPINE

## (undated) DEVICE — WRAP COMPRESSION CALF BARIATRIC

## (undated) DEVICE — DRAPE TOWEL BLUE 17" X 24"

## (undated) DEVICE — WRAP COMPRESSION CALF LG

## (undated) DEVICE — LIGASURE IMPACT

## (undated) DEVICE — TAPE SILK 3"